# Patient Record
Sex: FEMALE | Race: WHITE | NOT HISPANIC OR LATINO | Employment: FULL TIME | URBAN - METROPOLITAN AREA
[De-identification: names, ages, dates, MRNs, and addresses within clinical notes are randomized per-mention and may not be internally consistent; named-entity substitution may affect disease eponyms.]

---

## 2023-04-13 ENCOUNTER — TELEPHONE (OUTPATIENT)
Dept: PRIMARY CARE | Facility: CLINIC | Age: 43
End: 2023-04-13
Payer: COMMERCIAL

## 2023-04-13 DIAGNOSIS — F41.9 ANXIETY: Primary | ICD-10-CM

## 2023-04-13 RX ORDER — VENLAFAXINE HYDROCHLORIDE 75 MG/1
1 CAPSULE, EXTENDED RELEASE ORAL DAILY
COMMUNITY
Start: 2022-10-25 | End: 2023-04-21 | Stop reason: SDUPTHER

## 2023-04-13 RX ORDER — VENLAFAXINE HYDROCHLORIDE 75 MG/1
75 CAPSULE, EXTENDED RELEASE ORAL DAILY
Qty: 90 CAPSULE | Refills: 0 | OUTPATIENT
Start: 2023-04-13

## 2023-04-13 RX ORDER — ESCITALOPRAM OXALATE 10 MG/1
1 TABLET ORAL DAILY
COMMUNITY
End: 2023-04-21 | Stop reason: ALTCHOICE

## 2023-04-14 NOTE — TELEPHONE ENCOUNTER
Pt usually sees Carar.  She has not been in; please schedule and see if Aleja will do a bridge.  THANKS

## 2023-04-21 RX ORDER — VENLAFAXINE HYDROCHLORIDE 75 MG/1
75 CAPSULE, EXTENDED RELEASE ORAL DAILY
Qty: 90 CAPSULE | Refills: 0 | Status: SHIPPED | OUTPATIENT
Start: 2023-04-21 | End: 2023-07-10

## 2023-04-21 NOTE — TELEPHONE ENCOUNTER
Called pt and left voicemail to set up appointment with Aleja. - She is to let us know if she needs a bridge for her meds up to the follow up appointment.

## 2023-04-21 NOTE — TELEPHONE ENCOUNTER
Pt calling back, she made an apt w/ you for 4/26/23 but does need a refill on her medication until then.

## 2023-04-26 ENCOUNTER — LAB (OUTPATIENT)
Dept: LAB | Facility: LAB | Age: 43
End: 2023-04-26
Payer: COMMERCIAL

## 2023-04-26 ENCOUNTER — OFFICE VISIT (OUTPATIENT)
Dept: PRIMARY CARE | Facility: CLINIC | Age: 43
End: 2023-04-26
Payer: COMMERCIAL

## 2023-04-26 VITALS
WEIGHT: 132 LBS | DIASTOLIC BLOOD PRESSURE: 75 MMHG | TEMPERATURE: 97.9 F | BODY MASS INDEX: 24.29 KG/M2 | SYSTOLIC BLOOD PRESSURE: 110 MMHG | HEIGHT: 62 IN | HEART RATE: 92 BPM

## 2023-04-26 DIAGNOSIS — F32.A DEPRESSION, UNSPECIFIED DEPRESSION TYPE: ICD-10-CM

## 2023-04-26 DIAGNOSIS — R61 NIGHT SWEAT: ICD-10-CM

## 2023-04-26 DIAGNOSIS — R61 NIGHT SWEATS: ICD-10-CM

## 2023-04-26 DIAGNOSIS — F41.9 ANXIETY: Primary | ICD-10-CM

## 2023-04-26 DIAGNOSIS — D12.6 ADENOMA OF COLON: ICD-10-CM

## 2023-04-26 PROBLEM — G25.81 RESTLESS LEG SYNDROME: Status: ACTIVE | Noted: 2023-04-26

## 2023-04-26 PROBLEM — F51.04 CHRONIC INSOMNIA: Status: ACTIVE | Noted: 2023-04-26

## 2023-04-26 PROBLEM — D69.6 THROMBOCYTOPENIA (CMS-HCC): Status: RESOLVED | Noted: 2023-04-26 | Resolved: 2023-04-26

## 2023-04-26 PROBLEM — K52.9 COLITIS: Status: RESOLVED | Noted: 2023-04-26 | Resolved: 2023-04-26

## 2023-04-26 PROBLEM — K52.832 LYMPHOCYTIC COLITIS: Status: RESOLVED | Noted: 2023-04-26 | Resolved: 2023-04-26

## 2023-04-26 PROBLEM — N20.0 CALCULUS OF KIDNEY: Status: RESOLVED | Noted: 2023-04-26 | Resolved: 2023-04-26

## 2023-04-26 PROBLEM — M76.60 ACHILLES TENDON PAIN: Status: ACTIVE | Noted: 2023-04-26

## 2023-04-26 PROBLEM — R91.1 PULMONARY NODULE: Status: RESOLVED | Noted: 2023-04-26 | Resolved: 2023-04-26

## 2023-04-26 PROBLEM — D64.9 ANEMIA: Status: RESOLVED | Noted: 2023-04-26 | Resolved: 2023-04-26

## 2023-04-26 LAB — THYROTROPIN (MIU/L) IN SER/PLAS BY DETECTION LIMIT <= 0.05 MIU/L: 1.99 MIU/L (ref 0.44–3.98)

## 2023-04-26 PROCEDURE — 83001 ASSAY OF GONADOTROPIN (FSH): CPT

## 2023-04-26 PROCEDURE — 1036F TOBACCO NON-USER: CPT | Performed by: NURSE PRACTITIONER

## 2023-04-26 PROCEDURE — 83002 ASSAY OF GONADOTROPIN (LH): CPT

## 2023-04-26 PROCEDURE — 36415 COLL VENOUS BLD VENIPUNCTURE: CPT

## 2023-04-26 PROCEDURE — 84443 ASSAY THYROID STIM HORMONE: CPT

## 2023-04-26 PROCEDURE — 99214 OFFICE O/P EST MOD 30 MIN: CPT | Performed by: NURSE PRACTITIONER

## 2023-04-26 RX ORDER — BUSPIRONE HYDROCHLORIDE 5 MG/1
5 TABLET ORAL 2 TIMES DAILY
Qty: 60 TABLET | Refills: 1 | Status: SHIPPED | OUTPATIENT
Start: 2023-04-26 | End: 2023-07-11

## 2023-04-26 RX ORDER — HYDROXYZINE HYDROCHLORIDE 25 MG/1
25 TABLET, FILM COATED ORAL 2 TIMES DAILY PRN
COMMUNITY
End: 2024-05-14 | Stop reason: ALTCHOICE

## 2023-04-26 RX ORDER — IBUPROFEN 200 MG
TABLET ORAL EVERY 6 HOURS PRN
COMMUNITY
End: 2023-04-26 | Stop reason: ALTCHOICE

## 2023-04-26 ASSESSMENT — ENCOUNTER SYMPTOMS
HEADACHES: 0
FATIGUE: 0
CHEST TIGHTNESS: 0
FEVER: 0
NUMBNESS: 0
PALPITATIONS: 0
WEAKNESS: 0
DIZZINESS: 0
SHORTNESS OF BREATH: 0
COUGH: 0
CHILLS: 0

## 2023-04-26 NOTE — PROGRESS NOTES
Subjective   Cele George is a 42 y.o. female who presents for Med Refill and Follow-up (Couple issues to discuss).    Med Refill  Associated symptoms include abdominal pain, arthralgias, nausea and vomiting. Pertinent negatives include no chest pain, chills, coughing, fatigue, fever, headaches, numbness or weakness.      She presents to the office today for medication refills and follow up.   Taking the medication as prescribed. Taking daily in the morning.  No side effects.  Feeling sad down, helpless and  hopeless since being more anxious lately.  A lot of self doubt-  feeling not good enough.  Motivation has been a struggle lately.  No SI or HI.  Excessive worry all the time.  Panic attacks: Has been having panic attacks a couple times a week.   Takes Hydroxyzine but makes her too tired.  Has been over thinking many things.  Counseling: No counseling  Sleep: Takes Hydroxyzine to help with sleep but then too groggy in the morning.  Caffeine use: cup of coffee in the morning  Alcohol use: (+)social alcohol  Drug use: No drug  use  Diet: improving/ does not like meat  Exercise: [employer] 3-4 days a week. Walking  In December she was having several days of abdominal pain,  nausea and vomiting- to the ER due to dehydration.   She has since been scoped. EGD was good.  Colonoscopy revealed 5 polyps- adenomas- repeat in 3 years.  Gi system still not back to normal- she thinks her anxiety may be contributing.      She also reports she has been having night sweats. This has been going on for a while. Not any worse or better with the initiation of Venlafaxine about a year ago.    She still has pain in her thumb joints (R>L).  This is better if she rests and wears a brace.   She is not interested in a referral to ortho hand today.    Review of Systems   Constitutional:  Negative for chills, fatigue and fever.   Eyes:  Negative for visual disturbance.   Respiratory:  Negative for cough, chest tightness and shortness  "of breath.    Cardiovascular:  Negative for chest pain, palpitations and leg swelling.   Gastrointestinal:  Positive for abdominal pain, diarrhea, nausea and vomiting.   Musculoskeletal:  Positive for arthralgias.   Neurological:  Negative for dizziness, weakness, numbness and headaches.   Psychiatric/Behavioral:  Positive for dysphoric mood and sleep disturbance. Negative for self-injury and suicidal ideas.        Objective   /75 (BP Location: Right arm, Patient Position: Sitting)   Pulse 92   Temp 36.6 °C (97.9 °F) (Temporal)   Ht 1.575 m (5' 2\")   Wt 59.9 kg (132 lb)   BMI 24.14 kg/m²     Physical Exam  Constitutional:       General: She is not in acute distress.     Appearance: Normal appearance. She is not toxic-appearing.   Eyes:      Extraocular Movements: Extraocular movements intact.      Conjunctiva/sclera: Conjunctivae normal.      Pupils: Pupils are equal, round, and reactive to light.   Cardiovascular:      Rate and Rhythm: Normal rate and regular rhythm.      Pulses: Normal pulses.      Heart sounds: Normal heart sounds, S1 normal and S2 normal. No murmur heard.  Pulmonary:      Effort: Pulmonary effort is normal. No respiratory distress.      Breath sounds: Normal breath sounds.   Abdominal:      General: Bowel sounds are normal.      Palpations: Abdomen is soft.      Tenderness: There is no abdominal tenderness.   Musculoskeletal:      Cervical back: Normal range of motion.      Right lower leg: No edema.      Left lower leg: No edema.   Lymphadenopathy:      Cervical: No cervical adenopathy.   Neurological:      Mental Status: She is alert and oriented to person, place, and time.   Psychiatric:         Attention and Perception: Attention normal.         Mood and Affect: Mood and affect normal.         Behavior: Behavior normal. Behavior is cooperative.         Thought Content: Thought content normal.         Cognition and Memory: Cognition normal.         Judgment: Judgment normal. "         Assessment/Plan   Problem List Items Addressed This Visit          Digestive    Adenoma of colon     Repeat colonoscopy in 1/2026            Other    Anxiety     Add Buspirone to help with anxiety. Will follow up virtually in 4-6 weeks.         Relevant Medications    busPIRone (Buspar) 5 mg tablet    Depression     Continue Venlafaxine at the current dose.         Night sweat     Check labs today.          Other Visit Diagnoses       Night sweats    -  Primary    Relevant Orders    TSH with reflex to Free T4 if abnormal (Completed)    FSH    Luteinizing hormone

## 2023-04-27 LAB
FOLLITROPIN (IU/L) IN SER/PLAS: 2.8 IU/L
LUTEINIZING HORMONE (IU/ML) IN SER/PLAS: 1.4 IU/L

## 2023-04-27 ASSESSMENT — ENCOUNTER SYMPTOMS
ARTHRALGIAS: 1
DYSPHORIC MOOD: 1
VOMITING: 1
NAUSEA: 1
ABDOMINAL PAIN: 1
SLEEP DISTURBANCE: 1
DIARRHEA: 1

## 2023-06-07 ENCOUNTER — TELEMEDICINE (OUTPATIENT)
Dept: PRIMARY CARE | Facility: CLINIC | Age: 43
End: 2023-06-07
Payer: COMMERCIAL

## 2023-06-07 DIAGNOSIS — F41.9 ANXIETY: Primary | ICD-10-CM

## 2023-06-07 DIAGNOSIS — F32.A DEPRESSION, UNSPECIFIED DEPRESSION TYPE: ICD-10-CM

## 2023-06-07 PROBLEM — G47.10 EXCESSIVE SLEEPINESS: Status: RESOLVED | Noted: 2023-06-07 | Resolved: 2023-06-07

## 2023-06-07 PROBLEM — R53.83 FATIGUE: Status: RESOLVED | Noted: 2023-06-07 | Resolved: 2023-06-07

## 2023-06-07 PROBLEM — N28.89 MEDULLARY CALCIFICATION OF KIDNEY: Status: RESOLVED | Noted: 2023-06-07 | Resolved: 2023-06-07

## 2023-06-07 PROBLEM — E88.09 PROTEINS SERUM PLASMA LOW: Status: RESOLVED | Noted: 2023-06-07 | Resolved: 2023-06-07

## 2023-06-07 PROBLEM — R17 ELEVATED BILIRUBIN: Status: RESOLVED | Noted: 2023-06-07 | Resolved: 2023-06-07

## 2023-06-07 PROCEDURE — 99213 OFFICE O/P EST LOW 20 MIN: CPT | Performed by: NURSE PRACTITIONER

## 2023-06-07 ASSESSMENT — ENCOUNTER SYMPTOMS
NERVOUS/ANXIOUS: 1
FEVER: 0
SLEEP DISTURBANCE: 0
FATIGUE: 0
CHILLS: 0
DYSPHORIC MOOD: 1

## 2023-06-08 NOTE — ASSESSMENT & PLAN NOTE
Continue the current dose of medications for now.   Plan to follow up in 3 months or sooner if needed.

## 2023-06-08 NOTE — PROGRESS NOTES
Subjective   Patient ID: Cele George is a 42 y.o. female who presents for Follow-up (anxiety).    An interactive audio and video telecommunication system which permits real time communications between the patient (at the originating site) and provider (at the distant site) was utilized to provide this telehealth service.    Verbal consent was requested and obtained from Cele George for a telehealth visit. All issues as below were discussed and addressed but no physical exam was performed. If it was felt that the patient should be evaluated in the clinic then they were directed there. The patient verbally consented to the visit.  She was located in the [facility] for the visit.       HPI  She presents today virtually to follow up on anxiety and depression. She reports she has been taking the Venlafaxine in the morning as well as the Buspirone. Is often only taking the Buspirone once a day-forgets the second dose.  No side effects.  Not feeling sad, down, helpless or hopeless as much.  Overall more mellow.  Less snappy and irritable.  No SI or HI.  Still with anxiety and worrying.  (+) panic attacks but less frequent.  Takes the Hydroxyzine occasionally to help with sleep.  She would like to continue on the current dose for now.    Review of Systems   Constitutional:  Negative for chills, fatigue and fever.   Psychiatric/Behavioral:  Positive for dysphoric mood. Negative for self-injury, sleep disturbance and suicidal ideas. The patient is nervous/anxious.        Objective   There were no vitals taken for this visit.    Physical Exam  Constitutional:       General: She is not in acute distress.     Appearance: Normal appearance. She is not toxic-appearing.   Pulmonary:      Effort: Pulmonary effort is normal. No respiratory distress.   Neurological:      Mental Status: She is alert and oriented to person, place, and time.   Psychiatric:         Mood and Affect: Mood normal.         Behavior: Behavior normal.          Thought Content: Thought content normal.         Judgment: Judgment normal.         Assessment/Plan   Problem List Items Addressed This Visit          Other    Anxiety - Primary    Depression     Continue the current dose of medications for now.   Plan to follow up in 3 months or sooner if needed.                 It has been a pleasure seeing you today!

## 2023-07-10 DIAGNOSIS — F41.9 ANXIETY: ICD-10-CM

## 2023-07-10 RX ORDER — VENLAFAXINE HYDROCHLORIDE 75 MG/1
75 CAPSULE, EXTENDED RELEASE ORAL DAILY
Qty: 90 CAPSULE | Refills: 0 | Status: SHIPPED | OUTPATIENT
Start: 2023-07-10 | End: 2023-12-06 | Stop reason: SDUPTHER

## 2023-07-11 DIAGNOSIS — F41.9 ANXIETY: ICD-10-CM

## 2023-07-11 RX ORDER — BUSPIRONE HYDROCHLORIDE 5 MG/1
5 TABLET ORAL 2 TIMES DAILY
Qty: 180 TABLET | Refills: 0 | Status: SHIPPED | OUTPATIENT
Start: 2023-07-11 | End: 2023-12-11 | Stop reason: SDUPTHER

## 2023-10-04 DIAGNOSIS — F41.9 ANXIETY: Primary | ICD-10-CM

## 2023-10-09 RX ORDER — VENLAFAXINE HYDROCHLORIDE 37.5 MG/1
37.5 CAPSULE, EXTENDED RELEASE ORAL DAILY
Qty: 90 CAPSULE | Refills: 0 | Status: SHIPPED | OUTPATIENT
Start: 2023-10-09 | End: 2023-12-06 | Stop reason: WASHOUT

## 2023-11-29 ENCOUNTER — APPOINTMENT (OUTPATIENT)
Dept: PRIMARY CARE | Facility: CLINIC | Age: 43
End: 2023-11-29
Payer: COMMERCIAL

## 2023-12-06 DIAGNOSIS — F41.9 ANXIETY: ICD-10-CM

## 2023-12-06 RX ORDER — VENLAFAXINE HYDROCHLORIDE 75 MG/1
75 CAPSULE, EXTENDED RELEASE ORAL DAILY
Qty: 90 CAPSULE | Refills: 0 | Status: SHIPPED | OUTPATIENT
Start: 2023-12-06 | End: 2024-02-29 | Stop reason: DRUGHIGH

## 2023-12-11 ENCOUNTER — TELEMEDICINE (OUTPATIENT)
Dept: PRIMARY CARE | Facility: CLINIC | Age: 43
End: 2023-12-11
Payer: COMMERCIAL

## 2023-12-11 DIAGNOSIS — F32.A DEPRESSION, UNSPECIFIED DEPRESSION TYPE: ICD-10-CM

## 2023-12-11 DIAGNOSIS — R11.0 NAUSEA: ICD-10-CM

## 2023-12-11 DIAGNOSIS — F41.9 ANXIETY: Primary | ICD-10-CM

## 2023-12-11 PROCEDURE — 99213 OFFICE O/P EST LOW 20 MIN: CPT | Performed by: NURSE PRACTITIONER

## 2023-12-11 RX ORDER — BUSPIRONE HYDROCHLORIDE 5 MG/1
5 TABLET ORAL 3 TIMES DAILY
Qty: 270 TABLET | Refills: 0 | Status: SHIPPED | OUTPATIENT
Start: 2023-12-11 | End: 2024-05-14 | Stop reason: SDUPTHER

## 2023-12-11 RX ORDER — ONDANSETRON 4 MG/1
4 TABLET, FILM COATED ORAL EVERY 12 HOURS PRN
Qty: 9 TABLET | Refills: 0 | Status: SHIPPED | OUTPATIENT
Start: 2023-12-11 | End: 2023-12-18

## 2023-12-11 ASSESSMENT — ENCOUNTER SYMPTOMS
CHILLS: 0
SLEEP DISTURBANCE: 1
ABDOMINAL PAIN: 0
NAUSEA: 1
VOMITING: 1
DYSPHORIC MOOD: 1
NERVOUS/ANXIOUS: 1
FEVER: 0
FATIGUE: 0

## 2023-12-11 ASSESSMENT — PATIENT HEALTH QUESTIONNAIRE - PHQ9
SUM OF ALL RESPONSES TO PHQ9 QUESTIONS 1 AND 2: 0
2. FEELING DOWN, DEPRESSED OR HOPELESS: NOT AT ALL
1. LITTLE INTEREST OR PLEASURE IN DOING THINGS: NOT AT ALL

## 2023-12-11 NOTE — PROGRESS NOTES
Subjective   Cele George is a 43 y.o. female who presents for Med Refill.    An interactive audio and video telecommunication system which permits real time communications between the patient (at the originating site) and provider (at the distant site) was utilized to provide this telehealth service.    Verbal consent was requested and obtained from Cele George       for a telehealth visit. All issues as below were discussed and addressed but no physical exam was performed. If it was felt that the patient should be evaluated in the clinic then they were directed there. The patient verbally consented to the visit. She was located in the [facility] for the visit.       HPI  She presents today virtually for a follow up and medications refills. She is taking the medication as prescribed.   No side effects.  (+ ) Feeling sad and down, helpless and hopeless at times  This is mainly situational.  Motivation is ok.  NO SI or HI.  (+) excessive worry  (+) worry about worst case scenarios  (+) panic attacks occurring about once a week.    Sleep has not been great- taking Zzzquil at night to help with sleep. The Hydroxyzine makes her too sleepy in the morning when she takes it.  Feels she will need to make a change with medications as she is having a hard time with anxiety.   Anxiety is so bad at times she is having nausea. (+) vomiting at times.   Requests a refill on the Zofran. Takes very rarely.     Review of Systems   Constitutional:  Negative for chills, fatigue and fever.   Gastrointestinal:  Positive for nausea and vomiting. Negative for abdominal pain.   Psychiatric/Behavioral:  Positive for dysphoric mood and sleep disturbance. Negative for self-injury and suicidal ideas. The patient is nervous/anxious.      Objective   There were no vitals taken for this visit.    Physical Exam  Constitutional:       General: She is not in acute distress.     Appearance: Normal appearance. She is not toxic-appearing.    Pulmonary:      Effort: Pulmonary effort is normal. No respiratory distress.   Neurological:      Mental Status: She is alert and oriented to person, place, and time.   Psychiatric:         Mood and Affect: Mood normal.         Behavior: Behavior normal.         Thought Content: Thought content normal.         Judgment: Judgment normal.       Assessment/Plan   Problem List Items Addressed This Visit       Anxiety - Primary     Worse lately- increase the Venlafaxine to 150 mg daily. Follow up virtually in one month.         Relevant Medications    busPIRone (Buspar) 5 mg tablet    Depression     Worse lately- increase the Venlafaxine to 150 mg daily.         Nausea     PRN Zofran ordered.          Relevant Medications    ondansetron (Zofran) 4 mg tablet       It has been a pleasure seeing you today!

## 2023-12-12 ENCOUNTER — TELEPHONE (OUTPATIENT)
Dept: PRIMARY CARE | Facility: CLINIC | Age: 43
End: 2023-12-12
Payer: COMMERCIAL

## 2023-12-12 DIAGNOSIS — Z12.31 ENCOUNTER FOR SCREENING MAMMOGRAM FOR BREAST CANCER: Primary | ICD-10-CM

## 2023-12-12 NOTE — TELEPHONE ENCOUNTER
----- Message from MARCUS Perez sent at 12/11/2023 10:48 PM EST -----  She needs a 4-6 week VV follow up anxiety . Also check to see when her last mammogram was.

## 2023-12-12 NOTE — TELEPHONE ENCOUNTER
LM on pt voicemail to call to schedule for virtual on anxiety. Aleja would like to know when are last mammogram was.

## 2023-12-26 ENCOUNTER — ANCILLARY PROCEDURE (OUTPATIENT)
Dept: RADIOLOGY | Facility: CLINIC | Age: 43
End: 2023-12-26
Payer: COMMERCIAL

## 2023-12-26 DIAGNOSIS — Z12.31 ENCOUNTER FOR SCREENING MAMMOGRAM FOR BREAST CANCER: ICD-10-CM

## 2023-12-26 PROCEDURE — 77063 BREAST TOMOSYNTHESIS BI: CPT

## 2023-12-26 PROCEDURE — 77063 BREAST TOMOSYNTHESIS BI: CPT | Performed by: RADIOLOGY

## 2023-12-26 PROCEDURE — 77067 SCR MAMMO BI INCL CAD: CPT | Performed by: RADIOLOGY

## 2023-12-26 PROCEDURE — 77067 SCR MAMMO BI INCL CAD: CPT

## 2023-12-27 ENCOUNTER — HOSPITAL ENCOUNTER (OUTPATIENT)
Dept: RADIOLOGY | Facility: EXTERNAL LOCATION | Age: 43
Discharge: HOME | End: 2023-12-27

## 2023-12-29 ENCOUNTER — ANCILLARY PROCEDURE (OUTPATIENT)
Dept: RADIOLOGY | Facility: CLINIC | Age: 43
End: 2023-12-29
Payer: COMMERCIAL

## 2023-12-29 ENCOUNTER — TELEPHONE (OUTPATIENT)
Dept: PRIMARY CARE | Facility: CLINIC | Age: 43
End: 2023-12-29
Payer: COMMERCIAL

## 2023-12-29 DIAGNOSIS — R92.8 ABNORMAL SCREENING MAMMOGRAM: ICD-10-CM

## 2023-12-29 DIAGNOSIS — R92.8 ABNORMAL SCREENING MAMMOGRAM: Primary | ICD-10-CM

## 2023-12-29 PROCEDURE — 77065 DX MAMMO INCL CAD UNI: CPT | Mod: RT

## 2023-12-29 PROCEDURE — 77061 BREAST TOMOSYNTHESIS UNI: CPT | Mod: RIGHT SIDE | Performed by: STUDENT IN AN ORGANIZED HEALTH CARE EDUCATION/TRAINING PROGRAM

## 2023-12-29 PROCEDURE — 76642 ULTRASOUND BREAST LIMITED: CPT | Mod: RIGHT SIDE | Performed by: STUDENT IN AN ORGANIZED HEALTH CARE EDUCATION/TRAINING PROGRAM

## 2023-12-29 PROCEDURE — 76982 USE 1ST TARGET LESION: CPT | Mod: RT

## 2023-12-29 PROCEDURE — 76642 ULTRASOUND BREAST LIMITED: CPT | Mod: RT

## 2023-12-29 PROCEDURE — 77065 DX MAMMO INCL CAD UNI: CPT | Mod: RIGHT SIDE | Performed by: STUDENT IN AN ORGANIZED HEALTH CARE EDUCATION/TRAINING PROGRAM

## 2024-01-04 DIAGNOSIS — N60.01 BREAST CYST, RIGHT: Primary | ICD-10-CM

## 2024-01-10 ENCOUNTER — TELEMEDICINE (OUTPATIENT)
Dept: PRIMARY CARE | Facility: CLINIC | Age: 44
End: 2024-01-10
Payer: COMMERCIAL

## 2024-01-10 DIAGNOSIS — R11.0 NAUSEA: ICD-10-CM

## 2024-01-10 DIAGNOSIS — F32.A DEPRESSION, UNSPECIFIED DEPRESSION TYPE: ICD-10-CM

## 2024-01-10 DIAGNOSIS — F41.9 ANXIETY: Primary | ICD-10-CM

## 2024-01-10 PROCEDURE — 99213 OFFICE O/P EST LOW 20 MIN: CPT | Performed by: NURSE PRACTITIONER

## 2024-01-10 ASSESSMENT — ENCOUNTER SYMPTOMS
FEVER: 0
FATIGUE: 0
NERVOUS/ANXIOUS: 1
NAUSEA: 1
SLEEP DISTURBANCE: 0
VOMITING: 0
DYSPHORIC MOOD: 1
PALPITATIONS: 1
CHILLS: 0

## 2024-01-10 NOTE — PROGRESS NOTES
Subjective   Cele George is a 43 y.o. female who presents for Anxiety.    An interactive audio and video telecommunication system which permits real time communications between the patient (at the originating site) and provider (at the distant site) was utilized to provide this telehealth service.    Verbal consent was requested and obtained from Cele George            for a telehealth visit. All issues as below were discussed and addressed but no physical exam was performed. If it was felt that the patient should be evaluated in the clinic then they were directed there. The patient verbally consented to the visit.  She was located in the [facility] for the visit.       HPI   She presents today by virtual visit for a follow-up on anxiety.  At her last visit on 12/11/2023 the venlafaxine dose was increased from 37.5mg to 75 mg daily.  She is taking in the a.m.  No significant side effects noted.  She reports she has noticed a small difference.  She still feels anxious and has mind racing.  At times  gets so nervous that she has to take Zofran due to nausea.  No feeling sad, down, helpless, hopeless.  No SI or HI.  About 10 days ago after going out to dinner she was sitting at a friend's house playing games and her alarm went off alerting her her heart rate was elevated.  It was 140.  She felt shaky and anxious at the time.  No associated chest pain, shortness of breath, dizziness.  This lasted about 3 hours.  Then she went home and fell asleep.  She felt fine the following day.    Review of Systems   Constitutional:  Negative for chills, fatigue and fever.   Cardiovascular:  Positive for palpitations.   Gastrointestinal:  Positive for nausea. Negative for vomiting.   Psychiatric/Behavioral:  Positive for dysphoric mood. Negative for self-injury, sleep disturbance and suicidal ideas. The patient is nervous/anxious.        Objective   LMP  (LMP Unknown)     Physical Exam  Constitutional:       General: She is  not in acute distress.     Appearance: Normal appearance. She is not toxic-appearing.   Pulmonary:      Effort: Pulmonary effort is normal. No respiratory distress.   Neurological:      Mental Status: She is alert and oriented to person, place, and time.   Psychiatric:         Mood and Affect: Mood normal.         Behavior: Behavior normal.         Thought Content: Thought content normal.         Judgment: Judgment normal.         Assessment/Plan   Problem List Items Addressed This Visit       Anxiety - Primary     Continue with the current dose of venlafaxine for now.  Plan to follow-up in 3 months or sooner if needed.         Depression     Continue with the current dose of venlafaxine for now.  Plan to follow-up in 3 months or sooner if needed.          Nausea     As needed Zofran ordered-advised to sparingly.            It has been a pleasure seeing you today!

## 2024-01-11 NOTE — ASSESSMENT & PLAN NOTE
Continue with the current dose of venlafaxine for now.  Plan to follow-up in 3 months or sooner if needed.

## 2024-02-05 ENCOUNTER — TELEMEDICINE (OUTPATIENT)
Dept: PRIMARY CARE | Facility: CLINIC | Age: 44
End: 2024-02-05
Payer: COMMERCIAL

## 2024-02-05 ENCOUNTER — APPOINTMENT (OUTPATIENT)
Dept: PRIMARY CARE | Facility: CLINIC | Age: 44
End: 2024-02-05
Payer: COMMERCIAL

## 2024-02-05 DIAGNOSIS — J01.10 ACUTE NON-RECURRENT FRONTAL SINUSITIS: Primary | ICD-10-CM

## 2024-02-05 PROCEDURE — 1036F TOBACCO NON-USER: CPT | Performed by: NURSE PRACTITIONER

## 2024-02-05 PROCEDURE — 99213 OFFICE O/P EST LOW 20 MIN: CPT | Performed by: NURSE PRACTITIONER

## 2024-02-05 RX ORDER — CEPHALEXIN 500 MG/1
500 CAPSULE ORAL 2 TIMES DAILY
Qty: 20 CAPSULE | Refills: 0 | Status: SHIPPED | OUTPATIENT
Start: 2024-02-05 | End: 2024-02-15

## 2024-02-05 NOTE — PATIENT INSTRUCTIONS
Stop the augmentin.   Take the cephalexin as directed. Please add Fluticasone 2 sprays each nostril daily.   Let us know in 4-5 days if no better

## 2024-02-05 NOTE — PROGRESS NOTES
Subjective   Patient ID: Cele George is a 43 y.o. female who presents for Follow-up (From urgent care).    [facility] today for virtual visit to follow up on sinus infection. [facility] given augmentin for a sinus infection Made her throw up, tried again yeateerday ate with it and made her nauseous.   Had a cold 2 weeks ago and in the last week she developed left sinus pressure and discolored discharge.   No fever or cough noted    Review of Systems   HENT:          As noted in HPI         Objective   LMP  (LMP Unknown)     Physical Exam  Constitutional:       Appearance: She is normal weight.   Pulmonary:      Effort: Pulmonary effort is normal.   Neurological:      Mental Status: She is alert.   Psychiatric:         Mood and Affect: Mood normal.         Behavior: Behavior normal.         Thought Content: Thought content normal.         Judgment: Judgment normal.         Assessment/Plan   Problem List Items Addressed This Visit    None  Visit Diagnoses         Codes    Acute non-recurrent frontal sinusitis    -  Primary J01.10    Relevant Medications    cephalexin (Keflex) 500 mg capsule

## 2024-02-29 DIAGNOSIS — F41.9 ANXIETY: Primary | ICD-10-CM

## 2024-02-29 RX ORDER — VENLAFAXINE HYDROCHLORIDE 150 MG/1
150 CAPSULE, EXTENDED RELEASE ORAL DAILY
Qty: 90 CAPSULE | Refills: 0 | Status: SHIPPED | OUTPATIENT
Start: 2024-02-29 | End: 2024-05-14 | Stop reason: SDUPTHER

## 2024-05-14 ENCOUNTER — OFFICE VISIT (OUTPATIENT)
Dept: PRIMARY CARE | Facility: CLINIC | Age: 44
End: 2024-05-14
Payer: COMMERCIAL

## 2024-05-14 VITALS
HEIGHT: 61 IN | SYSTOLIC BLOOD PRESSURE: 112 MMHG | TEMPERATURE: 97.8 F | DIASTOLIC BLOOD PRESSURE: 80 MMHG | WEIGHT: 135 LBS | HEART RATE: 97 BPM | OXYGEN SATURATION: 97 % | BODY MASS INDEX: 25.49 KG/M2

## 2024-05-14 DIAGNOSIS — F32.A DEPRESSION, UNSPECIFIED DEPRESSION TYPE: ICD-10-CM

## 2024-05-14 DIAGNOSIS — F41.9 ANXIETY: ICD-10-CM

## 2024-05-14 DIAGNOSIS — F51.04 CHRONIC INSOMNIA: Primary | ICD-10-CM

## 2024-05-14 PROCEDURE — 99214 OFFICE O/P EST MOD 30 MIN: CPT | Performed by: NURSE PRACTITIONER

## 2024-05-14 RX ORDER — TRAZODONE HYDROCHLORIDE 50 MG/1
50 TABLET ORAL NIGHTLY PRN
Qty: 45 TABLET | Refills: 0 | Status: SHIPPED | OUTPATIENT
Start: 2024-05-14

## 2024-05-14 RX ORDER — BUSPIRONE HYDROCHLORIDE 5 MG/1
5 TABLET ORAL 3 TIMES DAILY
Qty: 270 TABLET | Refills: 1 | Status: SHIPPED | OUTPATIENT
Start: 2024-05-14

## 2024-05-14 RX ORDER — VENLAFAXINE HYDROCHLORIDE 150 MG/1
150 CAPSULE, EXTENDED RELEASE ORAL DAILY
Qty: 90 CAPSULE | Refills: 1 | Status: SHIPPED | OUTPATIENT
Start: 2024-05-14

## 2024-05-14 ASSESSMENT — ENCOUNTER SYMPTOMS
FEVER: 0
FATIGUE: 0
INSOMNIA: 1
CHILLS: 0
SLEEP DISTURBANCE: 0
DYSPHORIC MOOD: 0

## 2024-05-14 NOTE — PROGRESS NOTES
"Subjective    Cele George is a 43 y.o. female who presents for Insomnia (Tried Unison, Zequil, and Hydroxyzine, nothing has helped. This has been going on for 2-3 weeks. She has tried this medications for the last 3 weeks, all of them (mixture of medications)).    Insomnia  Pertinent negatives include no chills, fatigue or fever.     She presents to the office today for follow up on anxiety and depression. Her biggest issue currently is insomnia.  She is taking the medication as prescribed.   No side effects.  No feeling sad, down, helpless or hopeless.  Motivation is good  No SI or HI.  (+) excessive worry.  (+) worry about worst case scenarios.  No panic attacks.  Was irritable at work until increasing the venlafaxine.  Feels anxiety and depression are in a good place on the current medications.    Sleep: sleeping from 10pm-1 AM.   Then up every 20 minutes tossing and turning.  No mind racing just unable to sleep.  Is having a hard time making it through the work day she is so tired.  Diet:  Overall pretty healthy  Exercise: No regular exercise.  Caffeine use: 2 cups of coffee in the morning.  Alcohol use: 2 drinks a week  Drug use: None    Review of Systems   Constitutional:  Negative for chills, fatigue and fever.   Psychiatric/Behavioral:  Negative for dysphoric mood, self-injury, sleep disturbance and suicidal ideas. The patient is nervous/anxious and has insomnia.      Objective   /80 (BP Location: Left arm, Patient Position: Sitting)   Pulse 97   Temp 36.6 °C (97.8 °F) (Temporal)   Ht 1.551 m (5' 1.06\")   Wt 61.2 kg (135 lb)   LMP  (LMP Unknown)   SpO2 97%   BMI 25.46 kg/m²     Physical Exam  Constitutional:       General: She is not in acute distress.     Appearance: Normal appearance. She is not toxic-appearing.   Eyes:      Extraocular Movements: Extraocular movements intact.      Conjunctiva/sclera: Conjunctivae normal.      Pupils: Pupils are equal, round, and reactive to light. "   Cardiovascular:      Rate and Rhythm: Normal rate and regular rhythm.      Pulses: Normal pulses.      Heart sounds: Normal heart sounds, S1 normal and S2 normal. No murmur heard.  Pulmonary:      Effort: Pulmonary effort is normal. No respiratory distress.      Breath sounds: Normal breath sounds.   Abdominal:      General: Bowel sounds are normal.      Palpations: Abdomen is soft.      Tenderness: There is no abdominal tenderness.   Musculoskeletal:      Right lower leg: No edema.      Left lower leg: No edema.   Lymphadenopathy:      Cervical: No cervical adenopathy.   Neurological:      Mental Status: She is alert and oriented to person, place, and time.   Psychiatric:         Attention and Perception: Attention normal.         Mood and Affect: Mood and affect normal.         Behavior: Behavior normal. Behavior is cooperative.         Thought Content: Thought content normal.         Cognition and Memory: Cognition normal.         Judgment: Judgment normal.         Assessment/Plan   Problem List Items Addressed This Visit       Anxiety     Well-controlled the current dose of venlafaxine.  Continue.         Relevant Medications    busPIRone (Buspar) 5 mg tablet    venlafaxine XR (Effexor-XR) 150 mg 24 hr capsule    Chronic insomnia - Primary     Hydroxyzine.  Start trazodone at bedtime.  She will keep me posted on how she is doing.         Relevant Medications    traZODone (Desyrel) 50 mg tablet    Depression     Well-controlled on the current dose of venlafaxine.  Continue.            It has been a pleasure seeing you today!

## 2024-05-14 NOTE — PATIENT INSTRUCTIONS
Continue to take current medications.  Stop Hydroxyzine.   You may take Trazodone at bedtime to help with sleep.

## 2024-05-16 ASSESSMENT — ENCOUNTER SYMPTOMS: NERVOUS/ANXIOUS: 1

## 2024-07-09 ENCOUNTER — HOSPITAL ENCOUNTER (OUTPATIENT)
Dept: RADIOLOGY | Facility: HOSPITAL | Age: 44
Discharge: HOME | End: 2024-07-09
Payer: COMMERCIAL

## 2024-07-09 ENCOUNTER — APPOINTMENT (OUTPATIENT)
Dept: RADIOLOGY | Facility: HOSPITAL | Age: 44
End: 2024-07-09
Payer: COMMERCIAL

## 2024-07-09 DIAGNOSIS — R92.1 CALCIFICATION OF RIGHT BREAST ON MAMMOGRAPHY: ICD-10-CM

## 2024-07-09 DIAGNOSIS — R92.8 ABNORMAL FINDINGS ON DIAGNOSTIC IMAGING OF BREAST: Primary | ICD-10-CM

## 2024-07-09 DIAGNOSIS — N60.01 BREAST CYST, RIGHT: ICD-10-CM

## 2024-07-09 PROCEDURE — 77065 DX MAMMO INCL CAD UNI: CPT | Mod: RIGHT SIDE | Performed by: RADIOLOGY

## 2024-07-09 PROCEDURE — 76642 ULTRASOUND BREAST LIMITED: CPT | Mod: RIGHT SIDE | Performed by: RADIOLOGY

## 2024-07-09 PROCEDURE — 76982 USE 1ST TARGET LESION: CPT | Mod: RT

## 2024-07-09 PROCEDURE — 76642 ULTRASOUND BREAST LIMITED: CPT | Mod: RT

## 2024-07-09 PROCEDURE — 77065 DX MAMMO INCL CAD UNI: CPT | Mod: RT

## 2024-07-09 NOTE — NURSING NOTE
After patient review of diagnostic results with Dr. Daily, support provided. Written literature regarding abnormal breast imaging and breast biopsy including what to expect before, during, and after the procedure reviewed with the patient. All questions answered. Patient selected Dr. Mojica for surgical consultation 7/11 8:30AM with biopsy to follow 7/12 8:00AM. Information provided and reviewed to include provider information and how to reach me directly with questions or concerns before concluding visit.

## 2024-07-09 NOTE — PROGRESS NOTES
Patient follow up scheduling:  right breast stereotactic biopsy per provider recommendation, 7/12 0800.

## 2024-07-09 NOTE — Clinical Note
Your patient Cele George seen for diagnostic breast imaging today has final results available for your review. I assisted with follow up scheduling and education review today. She will see Dr. Mojica for consultation 7/11, with biopsy to follow 7/12.

## 2024-07-10 ENCOUNTER — TELEPHONE (OUTPATIENT)
Dept: RADIOLOGY | Facility: HOSPITAL | Age: 44
End: 2024-07-10
Payer: COMMERCIAL

## 2024-07-10 NOTE — TELEPHONE ENCOUNTER
This RN Navigator responded with call back to patient v/m. Answered patient clarifying questions regarding surgical consult tomorrow with Dr. Mojica & what to expect. Therapeutic listening and support provided. Encouraged patient to call back as needed.

## 2024-07-11 ENCOUNTER — OFFICE VISIT (OUTPATIENT)
Dept: SURGERY | Facility: CLINIC | Age: 44
End: 2024-07-11
Payer: COMMERCIAL

## 2024-07-11 VITALS
BODY MASS INDEX: 26.23 KG/M2 | SYSTOLIC BLOOD PRESSURE: 118 MMHG | HEIGHT: 60 IN | OXYGEN SATURATION: 99 % | DIASTOLIC BLOOD PRESSURE: 80 MMHG | WEIGHT: 133.6 LBS | HEART RATE: 95 BPM

## 2024-07-11 DIAGNOSIS — R92.1 BREAST CALCIFICATIONS: Primary | ICD-10-CM

## 2024-07-11 PROCEDURE — 1036F TOBACCO NON-USER: CPT | Performed by: SURGERY

## 2024-07-11 PROCEDURE — 99204 OFFICE O/P NEW MOD 45 MIN: CPT | Performed by: SURGERY

## 2024-07-11 ASSESSMENT — ENCOUNTER SYMPTOMS
CHILLS: 0
FEVER: 0
DIARRHEA: 0
DIZZINESS: 0
COUGH: 0
NAUSEA: 1
PALPITATIONS: 0
CONSTIPATION: 0
HEADACHES: 0
BLOOD IN STOOL: 0
ABDOMINAL PAIN: 0
SHORTNESS OF BREATH: 0
VOMITING: 1

## 2024-07-11 NOTE — PROGRESS NOTES
GENERAL SURGERY CLINIC NOTE    Cele George   1980   [id number]     History Of Present Illness  Cele George is a 43 y.o. female who presents to the office for evaluation of right breast calcifications. She has not noticed any masses, nipple discharge or abnormalities. She has occasional right breast pain. She is scheduled for a biopsy tomorrow 7/12/24.    She underwent menarche at 10-11 and does not know if she has gone through menopause. She underwent hysterectomy at 30 and her ovaries are intact. She had 4 pregnancies, with her first at 17, and 2 children. She took OCPs prior to her hysterectomy.    Her  and sister in law were present for the appointment.     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    Surgical History  She has a past surgical history that includes Hysterectomy (01/13/2017); Kidney surgery (01/13/2017); Dilation and curettage of uterus (01/13/2017); and Appendectomy (03/20/2018).    Medications  Current Outpatient Medications on File Prior to Visit   Medication Sig Dispense Refill    busPIRone (Buspar) 5 mg tablet Take 1 tablet (5 mg) by mouth 3 times a day. 270 tablet 1    traZODone (Desyrel) 50 mg tablet Take 1 tablet (50 mg) by mouth as needed at bedtime for sleep. 45 tablet 0    venlafaxine XR (Effexor-XR) 150 mg 24 hr capsule Take 1 capsule (150 mg) by mouth once daily. Take with food. 90 capsule 1     No current facility-administered medications on file prior to visit.       Allergies  Codeine      Social History  She reports that she quit smoking about 3 years ago. Her smoking use included cigarettes. She has a 20 pack-year smoking history. She has been exposed to tobacco smoke. She has never used smokeless tobacco. She reports that she does not currently use alcohol. She reports that she does not use drugs.    Family History  Family History   Problem Relation Name Age of Onset    COPD Mother Mom     Skin cancer Mother Mom     Miscarriages / Stillbirths Mother Mom     Prostate cancer Father      Skin cancer Father      Muscular dystrophy Sister      Colon cancer Paternal Grandmother Grandma Gehri     Breast cancer Paternal Grandmother Grandma Gehri    Paternal grandmother had both breast and colon cancer at a later age (likely >60)  Father had prostate and skin cancer  Mother had skin cancer     Review of Systems   Constitutional:  Negative for chills and fever.   Respiratory:  Negative for cough and shortness of breath.    Cardiovascular:  Negative for chest pain and palpitations.   Gastrointestinal:  Positive for nausea and vomiting. Negative for abdominal pain, blood in stool, constipation and diarrhea.        Nausea and vomiting when stressed   Neurological:  Negative for dizziness and headaches.   All other systems reviewed and are negative.      Last Recorded Vitals  Blood pressure 118/80, pulse 95, height 1.524 m (5'), weight 60.6 kg (133 lb 9.6 oz), SpO2 99%.     Physical Exam  Constitutional:       General: She is not in acute distress.     Appearance: Normal appearance. She is not ill-appearing.   HENT:      Head: Normocephalic and atraumatic.   Cardiovascular:      Rate and Rhythm: Normal rate and regular rhythm.   Pulmonary:      Effort: Pulmonary effort is normal. No respiratory distress.      Breath sounds: Normal breath sounds.   Chest:   Breasts:     Right: Normal. No mass, nipple discharge, skin change or tenderness.      Left: Normal. No mass, nipple discharge, skin change or tenderness.    Abdominal:      General: There is no distension.      Palpations: Abdomen is soft.      Tenderness: There is no abdominal tenderness. There is no guarding.   Musculoskeletal:         General: No swelling.   Lymphadenopathy:      Upper Body:      Right upper body: No supraclavicular, axillary or pectoral adenopathy.      Left upper body: No supraclavicular, axillary or pectoral adenopathy.   Skin:     General: Skin is warm and dry.   Neurological:      Mental Status: She is alert and oriented to person, place, and time. Mental status is at baseline.   Psychiatric:         Mood and Affect: Mood normal.         Behavior: Behavior normal.          Relevant Results    BI mammo right diagnostic    Indeterminate calcifications right breast posterior depth.   Stable mammographic asymmetries reflect complex sonographic cysts. Continued surveillance is recommended over 2 year interval.     BI-RADS CATEGORY: BI-RADS Category:  4 Suspicious. Recommendation:  Surgical Consultation and Biopsy. Recommended Date:  Immediate. Laterality:  Right.   Stereotactic core biopsy right breast recommended. Surgical consultation with history and physical required prior to biopsy.   For any future breast imaging appointments, please call [phone] ([phone]).       MACRO: None   Signed by: Deion Daily 7/9/2024 9:06 AM Dictation workstation:   [id number]    Assessment and Plan  43 y.o. female with calcifications in her right breast as well as stable cysts. She is undergoing a stereotactic right breast biopsy tomorrow 7/12/24. I will call her with the initial results (5pm or later, after she is home from work) and asked her to follow up in the office in a week to discuss the results and next steps. She and her family expressed their understanding and all questions were answered.    Mary Mojica MD, FACS  General Surgery

## 2024-07-12 ENCOUNTER — HOSPITAL ENCOUNTER (OUTPATIENT)
Dept: RADIOLOGY | Facility: HOSPITAL | Age: 44
Discharge: HOME | End: 2024-07-12
Payer: COMMERCIAL

## 2024-07-12 VITALS
SYSTOLIC BLOOD PRESSURE: 121 MMHG | HEART RATE: 101 BPM | OXYGEN SATURATION: 100 % | DIASTOLIC BLOOD PRESSURE: 85 MMHG | RESPIRATION RATE: 18 BRPM

## 2024-07-12 DIAGNOSIS — R92.1 CALCIFICATION OF RIGHT BREAST ON MAMMOGRAPHY: ICD-10-CM

## 2024-07-12 DIAGNOSIS — R92.8 ABNORMAL FINDINGS ON DIAGNOSTIC IMAGING OF BREAST: ICD-10-CM

## 2024-07-12 PROCEDURE — 19081 BX BREAST 1ST LESION STRTCTC: CPT | Mod: RT

## 2024-07-12 PROCEDURE — 2720000007 HC OR 272 NO HCPCS

## 2024-07-12 PROCEDURE — 77065 DX MAMMO INCL CAD UNI: CPT | Mod: RT

## 2024-07-12 ASSESSMENT — PAIN SCALES - GENERAL: PAINLEVEL_OUTOF10: 0 - NO PAIN

## 2024-07-12 ASSESSMENT — PAIN - FUNCTIONAL ASSESSMENT: PAIN_FUNCTIONAL_ASSESSMENT: 0-10

## 2024-07-12 NOTE — DISCHARGE INSTRUCTIONS
AFTER THE TEST  A steri-strip and/or bandage will be placed over the incision. You may shower after 24 hours, however avoid swimming or soaking in tub for 3 days. Remove bandage after the shower. If present, leave the steri-strips in place to fall off on their own. If after 1 week the steri-strips are still on, you may remove them.      You may have mild discomfort at the test site. If needed, you may take Tylenol (Acetaminophen) for pain. Please avoid taking NSAIDs, Motrin, Advil, Aleve, or ibuprofen for 24 hours following the biopsy. After 24 hours you may resume NSAIDSs.      If you take aspirin, Plavix, Coumadin, Xarelto or Eliquis please tell us. If these medications were stopped by your provider, please ask them when to resume.      You may have some tenderness, bruising or slight bleeding at the site. Please apply ice packs to the site for 15 minutes on and 15 minutes off for a 2 hour minimum.      Most people can return to their usual routine after the procedure. Avoid Strenuous activity for 24 hours.      Sleep in a supportive bra the night after your biopsy. Continue to do so for comfort.      Call your provider if you have any of the following symptoms :  Fever  Increased pain  Increased bleeding  Redness  Increased swelling  Yellowish drainage    Your provider will get the biopsy results, usually within 5 - 7 days. Call your provider with any questions.

## 2024-07-18 LAB
LABORATORY COMMENT REPORT: NORMAL
PATH REPORT.FINAL DX SPEC: NORMAL
PATH REPORT.GROSS SPEC: NORMAL
PATH REPORT.RELEVANT HX SPEC: NORMAL
PATH REPORT.TOTAL CANCER: NORMAL

## 2024-07-19 ENCOUNTER — APPOINTMENT (OUTPATIENT)
Dept: SURGERY | Facility: CLINIC | Age: 44
End: 2024-07-19
Payer: COMMERCIAL

## 2024-07-19 VITALS
SYSTOLIC BLOOD PRESSURE: 106 MMHG | OXYGEN SATURATION: 98 % | BODY MASS INDEX: 26.46 KG/M2 | WEIGHT: 134.8 LBS | HEIGHT: 60 IN | HEART RATE: 88 BPM | DIASTOLIC BLOOD PRESSURE: 74 MMHG

## 2024-07-19 DIAGNOSIS — F51.04 CHRONIC INSOMNIA: ICD-10-CM

## 2024-07-19 DIAGNOSIS — R92.1 BREAST CALCIFICATIONS: Primary | ICD-10-CM

## 2024-07-19 PROCEDURE — 1036F TOBACCO NON-USER: CPT | Performed by: SURGERY

## 2024-07-19 PROCEDURE — 3008F BODY MASS INDEX DOCD: CPT | Performed by: SURGERY

## 2024-07-19 PROCEDURE — 99213 OFFICE O/P EST LOW 20 MIN: CPT | Performed by: SURGERY

## 2024-07-19 RX ORDER — TRAZODONE HYDROCHLORIDE 50 MG/1
50 TABLET ORAL NIGHTLY PRN
Qty: 90 TABLET | Refills: 1 | Status: SHIPPED | OUTPATIENT
Start: 2024-07-19

## 2024-07-19 ASSESSMENT — ENCOUNTER SYMPTOMS
HEADACHES: 0
CONSTIPATION: 0
DIARRHEA: 0
CHILLS: 0
SHORTNESS OF BREATH: 0
FEVER: 0
ABDOMINAL PAIN: 0
BLOOD IN STOOL: 0
VOMITING: 1
COUGH: 0
PALPITATIONS: 0
DIZZINESS: 0
NAUSEA: 1

## 2024-07-19 NOTE — PROGRESS NOTES
GENERAL SURGERY CLINIC NOTE    Cele George   1980   [id number]     History Of Present Illness  Cele George is a 43 y.o. female who presents to the office for follow up of right breast calcifications after undergoing biopsy. She had some discomfort and bruising that has improved. She has not noticed any masses, nipple discharge or abnormalities. She has occasional right breast pain.     She underwent menarche at 10-11 and does not know if she has gone through menopause. She underwent hysterectomy at 30 and her ovaries are intact. She had 4 pregnancies, with her first at 17, and 2 children. She took OCPs prior to her hysterectomy.    Her  was present for the appointment.     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    Surgical History  She has a past surgical history that includes Hysterectomy (01/13/2017); Kidney surgery (01/13/2017); Dilation and curettage of uterus (01/13/2017); Appendectomy (03/20/2018); and Breast biopsy (Right, 07/12/2024).    Medications  Current Outpatient Medications on File Prior to Visit   Medication Sig Dispense Refill    busPIRone (Buspar) 5 mg tablet Take 1 tablet (5 mg) by mouth 3 times a day. 270 tablet 1    traZODone (Desyrel) 50 mg tablet Take 1 tablet (50 mg) by mouth as needed at bedtime for sleep. 45 tablet 0    venlafaxine XR (Effexor-XR) 150 mg 24 hr capsule Take 1 capsule (150 mg) by mouth once daily. Take with food. 90 capsule 1     No current facility-administered medications on file  prior to visit.       Allergies  Codeine     Social History  She reports that she quit smoking about 3 years ago. Her smoking use included cigarettes. She has a 20 pack-year smoking history. She has been exposed to tobacco smoke. She has never used smokeless tobacco. She reports that she does not currently use alcohol. She reports that she does not use drugs.    Family History  Family History   Problem Relation Name Age of Onset    COPD Mother Mom     Skin cancer Mother Mom     Miscarriages / Stillbirths Mother Mom     Prostate cancer Father      Skin cancer Father      Muscular dystrophy Sister      Colon cancer Paternal Grandmother Grandma Gehri     Breast cancer Paternal Grandmother Grandma Gehri    Paternal grandmother had both breast and colon cancer at a later age (likely >60)  Father had prostate and skin cancer  Mother had skin cancer     Review of Systems   Constitutional:  Negative for chills and fever.   Respiratory:  Negative for cough and shortness of breath.    Cardiovascular:  Negative for chest pain and palpitations.   Gastrointestinal:  Positive for nausea and vomiting. Negative for abdominal pain, blood in stool, constipation and diarrhea.        Nausea and vomiting when stressed   Neurological:  Negative for dizziness and headaches.   All other systems reviewed and are negative.      Last Recorded Vitals  Blood pressure 106/74, pulse 88, height 1.524 m (5'), weight 61.1 kg (134 lb 12.8 oz), SpO2 98%.     Physical Exam  Constitutional:       General: She is not in acute distress.     Appearance: Normal appearance. She is not ill-appearing.   HENT:      Head: Normocephalic and atraumatic.   Cardiovascular:      Rate and Rhythm: Normal rate and regular rhythm.   Pulmonary:      Effort: Pulmonary effort is normal. No respiratory distress.      Breath sounds: Normal breath sounds.   Musculoskeletal:         General: No swelling.   Skin:     General: Skin is warm and dry.   Neurological:      Mental  Status: She is alert and oriented to person, place, and time. Mental status is at baseline.   Psychiatric:         Mood and Affect: Mood normal.         Behavior: Behavior normal.          Relevant Results  Pathology results reviewed    BI mammo right diagnostic    Indeterminate calcifications right breast posterior depth.   Stable mammographic asymmetries reflect complex sonographic cysts. Continued surveillance is recommended over 2 year interval.     BI-RADS CATEGORY: BI-RADS Category:  4 Suspicious. Recommendation:  Surgical Consultation and Biopsy. Recommended Date:  Immediate. Laterality:  Right.   Stereotactic core biopsy right breast recommended. Surgical consultation with history and physical required prior to biopsy.   For any future breast imaging appointments, please call [phone]).       MACRO: None   Signed by: Deion Daily 7/9/2024 9:06 AM Dictation workstation:   [id number]    Assessment and Plan  43 y.o. female with calcifications in her right breast that on biopsy showed focal PASH, UDH and additional benign findings. I recommend undergoing repeat right mammogram in 6 months. If that is stable, then she can resume yearly screening 6 months from then. She expressed concern having to possibly undergo additional biopsies - I discussed that if this area appeared more concerning, we can consider an excisional biopsy (lumpectomy) at that time rather than core biopsy to remove the entire area of concern. Follow up on an as needed basis. She and her family expressed their understanding and all questions were answered.    Mary Mojica MD, FACS  General Surgery

## 2024-08-16 ENCOUNTER — APPOINTMENT (OUTPATIENT)
Dept: PRIMARY CARE | Facility: CLINIC | Age: 44
End: 2024-08-16
Payer: COMMERCIAL

## 2024-08-16 DIAGNOSIS — F51.04 CHRONIC INSOMNIA: ICD-10-CM

## 2024-08-16 DIAGNOSIS — F41.9 ANXIETY: Primary | ICD-10-CM

## 2024-08-16 DIAGNOSIS — F32.A DEPRESSION, UNSPECIFIED DEPRESSION TYPE: ICD-10-CM

## 2024-08-16 PROCEDURE — 99213 OFFICE O/P EST LOW 20 MIN: CPT | Performed by: NURSE PRACTITIONER

## 2024-08-16 PROCEDURE — 1036F TOBACCO NON-USER: CPT | Performed by: NURSE PRACTITIONER

## 2024-08-16 ASSESSMENT — ENCOUNTER SYMPTOMS
CHILLS: 0
FATIGUE: 0
FEVER: 0
NERVOUS/ANXIOUS: 1
DYSPHORIC MOOD: 1
SLEEP DISTURBANCE: 0

## 2024-08-16 NOTE — ASSESSMENT & PLAN NOTE
Well-controlled on trazodone.  Continue trazodone 25 mg at bedtime.  Plan to follow-up in 3 months or sooner if needed.

## 2024-08-16 NOTE — PROGRESS NOTES
Subjective   Cele George is a 44 y.o. female who presents for 3 mon fu.    An interactive audio and video telecommunication system which permits real time communications between the patient (at the originating site) and provider (at the distant site) was utilized to provide this telehealth service.    Verbal consent was requested and obtained from Cele George for a telehealth visit. All issues as below were discussed and addressed but no physical exam was performed. If it was felt that the patient should be evaluated in the clinic then they were directed there. The patient verbally consented to the visit.  She was located in the [facility] for the visit.       HPI  She presents today via virtual visit for a follow-up on anxiety, depression, and insomnia.  She continues take the Venlafaxine in the a.m.  She takes the buspirone 5 mg  3 times a day.  She reports her anxiety and depression has not been great lately.  She has a lot of situational things going on recently.  Last month she had a breast biopsy.  This month she reports there is a lot going on at home.  She reports feeling sad & down.  Motivation has not been great.  No SI or HI.  She reports excessive worry every day.  She does worry about worst case scenarios.  About 2 times a week she feels like something is sitting on her chest due to feeling anxious and overwhelmed.  She has been taking the trazodone at bedtime for sleep.  Tried the 50 mg dose but it made her too groggy in the morning.  She feels the trazodone 25 mg at bedtime has been helpful with her sleep.  She would like to continue the current dose of medications for now.  She plans to follow-up with her therapist again.  Her last visit was 2 months ago.  Diet:  Overall pretty healthy/ not eating much due to decreased appetite from anxiety and stress.  Exercise: No regular exercise.  Caffeine use: 2 cups of coffee in the morning.  Alcohol use: social alcohol use.  Drug use: None    Review  of Systems   Constitutional:  Negative for chills, fatigue and fever.   Psychiatric/Behavioral:  Positive for dysphoric mood. Negative for self-injury, sleep disturbance and suicidal ideas. The patient is nervous/anxious.        Objective   LMP  (LMP Unknown)     Physical Exam  Constitutional:       General: She is not in acute distress.     Appearance: Normal appearance. She is not toxic-appearing.   Pulmonary:      Effort: Pulmonary effort is normal. No respiratory distress.   Neurological:      Mental Status: She is alert and oriented to person, place, and time.   Psychiatric:         Mood and Affect: Mood normal.         Behavior: Behavior normal.         Thought Content: Thought content normal.         Judgment: Judgment normal.         Assessment/Plan   Problem List Items Addressed This Visit       Anxiety - Primary     Mainly situational.  She plans to follow-up with therapy.  If no improvements, will refer her to psychiatry to look into medication adjustments.  Follow-up in 3 months or sooner if needed.         Chronic insomnia     Well-controlled on trazodone.  Continue trazodone 25 mg at bedtime.  Plan to follow-up in 3 months or sooner if needed.         Depression     Mainly situational.  She plans to follow-up with therapy.  If no improvements, will refer her to psychiatry to look into medication adjustments.  Follow-up in 3 months or sooner if needed.            It has been a pleasure seeing you today!

## 2024-08-16 NOTE — ASSESSMENT & PLAN NOTE
Mainly situational.  She plans to follow-up with therapy.  If no improvements, will refer her to psychiatry to look into medication adjustments.  Follow-up in 3 months or sooner if needed.

## 2024-11-20 ENCOUNTER — APPOINTMENT (OUTPATIENT)
Dept: PRIMARY CARE | Facility: CLINIC | Age: 44
End: 2024-11-20
Payer: COMMERCIAL

## 2024-11-20 VITALS
WEIGHT: 134.2 LBS | HEART RATE: 87 BPM | SYSTOLIC BLOOD PRESSURE: 110 MMHG | DIASTOLIC BLOOD PRESSURE: 70 MMHG | TEMPERATURE: 97.5 F | OXYGEN SATURATION: 97 % | BODY MASS INDEX: 26.21 KG/M2

## 2024-11-20 DIAGNOSIS — F51.04 CHRONIC INSOMNIA: ICD-10-CM

## 2024-11-20 DIAGNOSIS — Z00.00 HEALTHCARE MAINTENANCE: Primary | ICD-10-CM

## 2024-11-20 DIAGNOSIS — M79.645 BILATERAL THUMB PAIN: ICD-10-CM

## 2024-11-20 DIAGNOSIS — M79.644 BILATERAL THUMB PAIN: ICD-10-CM

## 2024-11-20 DIAGNOSIS — M25.511 ACUTE PAIN OF RIGHT SHOULDER: ICD-10-CM

## 2024-11-20 PROBLEM — M25.522 LEFT ELBOW PAIN: Status: ACTIVE | Noted: 2024-11-20

## 2024-11-20 PROCEDURE — 1036F TOBACCO NON-USER: CPT | Performed by: NURSE PRACTITIONER

## 2024-11-20 PROCEDURE — 99396 PREV VISIT EST AGE 40-64: CPT | Performed by: NURSE PRACTITIONER

## 2024-11-20 PROCEDURE — 99213 OFFICE O/P EST LOW 20 MIN: CPT | Performed by: NURSE PRACTITIONER

## 2024-11-20 RX ORDER — TRAZODONE HYDROCHLORIDE 50 MG/1
50 TABLET ORAL NIGHTLY PRN
Qty: 90 TABLET | Refills: 1 | Status: SHIPPED | OUTPATIENT
Start: 2024-11-20

## 2024-11-20 ASSESSMENT — ENCOUNTER SYMPTOMS
FREQUENCY: 0
PALPITATIONS: 0
SORE THROAT: 0
ABDOMINAL PAIN: 0
ARTHRALGIAS: 1
BRUISES/BLEEDS EASILY: 0
DYSPHORIC MOOD: 1
VOMITING: 0
WHEEZING: 0
CHEST TIGHTNESS: 0
SINUS PRESSURE: 0
BACK PAIN: 0
HEMATURIA: 0
SPEECH DIFFICULTY: 0
COUGH: 0
BLOOD IN STOOL: 0
NERVOUS/ANXIOUS: 1
FEVER: 0
DIFFICULTY URINATING: 0
UNEXPECTED WEIGHT CHANGE: 0
NUMBNESS: 0
MYALGIAS: 0
CONSTIPATION: 0
EYE PAIN: 0
EYE ITCHING: 0
PHOTOPHOBIA: 0
WEAKNESS: 0
DIZZINESS: 0
NAUSEA: 0
RHINORRHEA: 0
DIARRHEA: 0
EYE REDNESS: 0
POLYDIPSIA: 0
SLEEP DISTURBANCE: 0
POLYPHAGIA: 0
SHORTNESS OF BREATH: 0
FATIGUE: 0
ADENOPATHY: 0
EYE DISCHARGE: 0
CHILLS: 0
DYSURIA: 0
NECK PAIN: 0
HEADACHES: 0

## 2024-11-20 ASSESSMENT — PATIENT HEALTH QUESTIONNAIRE - PHQ9
2. FEELING DOWN, DEPRESSED OR HOPELESS: NOT AT ALL
SUM OF ALL RESPONSES TO PHQ9 QUESTIONS 1 AND 2: 0
1. LITTLE INTEREST OR PLEASURE IN DOING THINGS: NOT AT ALL

## 2024-11-20 NOTE — PATIENT INSTRUCTIONS
Focus on a low sodium/low fat diet (whole grains, fresh fruits and vegetables, lean meats). Avoid foods high in sugar.  Increase exercise as tolerated.  Continue medications at the current dose.  Follow up in 6 months (virtually) or sooner if needed.

## 2024-11-20 NOTE — PROGRESS NOTES
Subjective    Cele George is a 44 y.o. female who presents for Annual Exam (GYN: none LMP: Partial Hysterectomy. ), Joint Swelling (Both thumbs), Transfer Of Care (From Dr Guerra), Shoulder Pain (Right Shoulder. ), and Flu Vaccine (Pt denied).    Shoulder Pain   Pertinent negatives include no fever or numbness.     Last well exam: several years ago.  Health has been good in general.   There have been no changes in the patients PMH, PSH, FH or social history. Reviewed today.  Diet: room for improvement.  Exercise: No scheduled exercise.   Dental: No regular dental exams- last 3 years ago. Brushes  2 times a day. Flosses occasionally  Vision: Last eye exam: 1 year ago    Corrected with glasses and contacts  Tobacco Use: None  Alcohol Use: 6 drinks a month  Sexually active:  and sexually active.  PAP: s/p hysterectomy- still has ovaries  Immunizations: recommended Tdap  Colonoscopy: Due in 2/2026  Mammogram: scheduled 1/2024 (follow up diagnostic)  Last labs: ordered today     Thumbs are very painful and they feel like they are grinding.   It effects both sides.  There is never any swelling, redness or warmth. This has been an ongoing issue for several years but now is much more painful.   Her right shoulder has been bothering her for 2 months- cannot pick anything up. Hurts to lay on it. No known injury.  No neck pain.   No radiation of pain down the arm  No numbness/tingling/weakness.    Left elbow started about a month ago- worse with straightening. This is not present all the time but more with certain movements.    She feels she is doing well on her current medications. She is sleeping better on the Trazodone. No side effects noted.   Anxiety is still and issues but overall fairly stable.        Review of Systems   Constitutional:  Negative for chills, fatigue, fever and unexpected weight change.   HENT:  Negative for congestion, ear pain, hearing loss, nosebleeds, postnasal drip, rhinorrhea, sinus  pressure, sore throat and tinnitus.    Eyes:  Negative for photophobia, pain, discharge, redness, itching and visual disturbance.   Respiratory:  Negative for cough, chest tightness, shortness of breath and wheezing.    Cardiovascular:  Negative for chest pain, palpitations and leg swelling.   Gastrointestinal:  Negative for abdominal pain, blood in stool, constipation, diarrhea, nausea and vomiting.   Endocrine: Negative for cold intolerance, heat intolerance, polydipsia, polyphagia and polyuria.   Genitourinary:  Negative for difficulty urinating, dysuria, frequency, hematuria and urgency.   Musculoskeletal:  Positive for arthralgias. Negative for back pain, myalgias and neck pain.   Skin:  Negative for rash.   Neurological:  Negative for dizziness, syncope, speech difficulty, weakness, numbness and headaches.   Hematological:  Negative for adenopathy. Does not bruise/bleed easily.   Psychiatric/Behavioral:  Positive for dysphoric mood. Negative for sleep disturbance. The patient is nervous/anxious.      Objective   /70 (BP Location: Left arm, Patient Position: Sitting)   Pulse 87   Temp 36.4 °C (97.5 °F) (Temporal)   Wt 60.9 kg (134 lb 3.2 oz)   LMP  (LMP Unknown)   SpO2 97%   BMI 26.21 kg/m²     Physical Exam  Constitutional:       General: She is not in acute distress.     Appearance: Normal appearance. She is not toxic-appearing.   HENT:      Head: Normocephalic and atraumatic.      Right Ear: Tympanic membrane and ear canal normal.      Left Ear: Tympanic membrane and ear canal normal.      Nose: Nose normal.      Mouth/Throat:      Mouth: Mucous membranes are moist.      Pharynx: Oropharynx is clear.   Eyes:      Extraocular Movements: Extraocular movements intact.      Conjunctiva/sclera: Conjunctivae normal.      Pupils: Pupils are equal, round, and reactive to light.   Neck:      Thyroid: No thyroid mass or thyromegaly.   Cardiovascular:      Rate and Rhythm: Normal rate and regular rhythm.       Pulses: Normal pulses.      Heart sounds: Normal heart sounds, S1 normal and S2 normal. No murmur heard.  Pulmonary:      Effort: Pulmonary effort is normal. No respiratory distress.      Breath sounds: Normal breath sounds.   Chest:   Breasts:     Right: Tenderness present.      Left: Normal.      Comments: (+) slight tenderness noted to lateral right breast  (+) dense breast tissue bilaterally  Abdominal:      General: Abdomen is flat. Bowel sounds are normal.      Palpations: Abdomen is soft.      Tenderness: There is generalized abdominal tenderness. There is no guarding or rebound.   Musculoskeletal:         General: Normal range of motion.      Right shoulder: Tenderness present. No crepitus. Normal range of motion. Normal strength. Normal pulse.      Left shoulder: Normal. Normal pulse.      Right hand: Tenderness present. No swelling. Normal range of motion. Normal strength. Normal sensation. Normal pulse.      Left hand: Tenderness present. No swelling. Normal range of motion. Normal strength. Normal sensation. Normal pulse.      Cervical back: Normal range of motion and neck supple.      Right lower leg: No edema.      Left lower leg: No edema.      Comments: (+) TTP base of both thumbs   Lymphadenopathy:      Cervical: No cervical adenopathy.      Upper Body:      Right upper body: No axillary adenopathy.      Left upper body: No axillary adenopathy.   Skin:     General: Skin is warm and dry.   Neurological:      Mental Status: She is alert and oriented to person, place, and time.      Cranial Nerves: No cranial nerve deficit.      Sensory: No sensory deficit.      Motor: No weakness.      Coordination: Coordination normal.      Gait: Gait normal.      Deep Tendon Reflexes: Reflexes are normal and symmetric. Reflexes normal.   Psychiatric:         Attention and Perception: Attention normal.         Mood and Affect: Mood and affect normal.         Speech: Speech normal.         Behavior: Behavior normal.          Thought Content: Thought content normal.         Judgment: Judgment normal.         Assessment/Plan   Problem List Items Addressed This Visit       Chronic insomnia     Stable on the current dose of Trazodone. Continue.         Relevant Medications    traZODone (Desyrel) 50 mg tablet    Bilateral thumb pain     Given persistent and chronic, refer to orthopedic hand.         Relevant Orders    Referral to Orthopaedic Surgery    Acute pain of right shoulder    Relevant Orders    Referral to Physical Therapy    Healthcare maintenance - Primary     UTD on age appropriate screenings.         Relevant Orders    Comprehensive Metabolic Panel    CBC and Auto Differential    Lipid Panel       It has been a pleasure seeing you today!

## 2024-11-25 ENCOUNTER — LAB (OUTPATIENT)
Dept: LAB | Facility: LAB | Age: 44
End: 2024-11-25
Payer: COMMERCIAL

## 2024-11-25 DIAGNOSIS — Z00.00 HEALTHCARE MAINTENANCE: ICD-10-CM

## 2024-11-25 DIAGNOSIS — D75.89 MACROCYTOSIS: ICD-10-CM

## 2024-11-25 DIAGNOSIS — E53.8 VITAMIN B12 DEFICIENCY: ICD-10-CM

## 2024-11-25 LAB
ALBUMIN SERPL BCP-MCNC: 4.1 G/DL (ref 3.4–5)
ALP SERPL-CCNC: 57 U/L (ref 33–110)
ALT SERPL W P-5'-P-CCNC: 9 U/L (ref 7–45)
ANION GAP SERPL CALC-SCNC: 13 MMOL/L (ref 10–20)
AST SERPL W P-5'-P-CCNC: 10 U/L (ref 9–39)
BASOPHILS # BLD AUTO: 0.03 X10*3/UL (ref 0–0.1)
BASOPHILS NFR BLD AUTO: 0.6 %
BILIRUB SERPL-MCNC: 1 MG/DL (ref 0–1.2)
BUN SERPL-MCNC: 12 MG/DL (ref 6–23)
CALCIUM SERPL-MCNC: 8.8 MG/DL (ref 8.6–10.3)
CHLORIDE SERPL-SCNC: 102 MMOL/L (ref 98–107)
CHOLEST SERPL-MCNC: 194 MG/DL (ref 0–199)
CHOLESTEROL/HDL RATIO: 3.1
CO2 SERPL-SCNC: 27 MMOL/L (ref 21–32)
CREAT SERPL-MCNC: 0.76 MG/DL (ref 0.5–1.05)
EGFRCR SERPLBLD CKD-EPI 2021: >90 ML/MIN/1.73M*2
EOSINOPHIL # BLD AUTO: 0.15 X10*3/UL (ref 0–0.7)
EOSINOPHIL NFR BLD AUTO: 2.9 %
ERYTHROCYTE [DISTWIDTH] IN BLOOD BY AUTOMATED COUNT: 12.2 % (ref 11.5–14.5)
GLUCOSE SERPL-MCNC: 90 MG/DL (ref 74–99)
HCT VFR BLD AUTO: 43.5 % (ref 36–46)
HDLC SERPL-MCNC: 63.4 MG/DL
HGB BLD-MCNC: 14.6 G/DL (ref 12–16)
IMM GRANULOCYTES # BLD AUTO: 0.01 X10*3/UL (ref 0–0.7)
IMM GRANULOCYTES NFR BLD AUTO: 0.2 % (ref 0–0.9)
LDLC SERPL CALC-MCNC: 107 MG/DL
LYMPHOCYTES # BLD AUTO: 2.1 X10*3/UL (ref 1.2–4.8)
LYMPHOCYTES NFR BLD AUTO: 40.1 %
MCH RBC QN AUTO: 33.6 PG (ref 26–34)
MCHC RBC AUTO-ENTMCNC: 33.6 G/DL (ref 32–36)
MCV RBC AUTO: 100 FL (ref 80–100)
MONOCYTES # BLD AUTO: 0.5 X10*3/UL (ref 0.1–1)
MONOCYTES NFR BLD AUTO: 9.5 %
NEUTROPHILS # BLD AUTO: 2.45 X10*3/UL (ref 1.2–7.7)
NEUTROPHILS NFR BLD AUTO: 46.7 %
NON HDL CHOLESTEROL: 131 MG/DL (ref 0–149)
NRBC BLD-RTO: 0 /100 WBCS (ref 0–0)
PLATELET # BLD AUTO: 224 X10*3/UL (ref 150–450)
POTASSIUM SERPL-SCNC: 4.2 MMOL/L (ref 3.5–5.3)
PROT SERPL-MCNC: 6.5 G/DL (ref 6.4–8.2)
RBC # BLD AUTO: 4.34 X10*6/UL (ref 4–5.2)
SODIUM SERPL-SCNC: 138 MMOL/L (ref 136–145)
TRIGL SERPL-MCNC: 119 MG/DL (ref 0–149)
VLDL: 24 MG/DL (ref 0–40)
WBC # BLD AUTO: 5.2 X10*3/UL (ref 4.4–11.3)

## 2024-11-25 PROCEDURE — 82607 VITAMIN B-12: CPT

## 2024-11-25 PROCEDURE — 80053 COMPREHEN METABOLIC PANEL: CPT

## 2024-11-25 PROCEDURE — 80061 LIPID PANEL: CPT

## 2024-11-25 PROCEDURE — 82746 ASSAY OF FOLIC ACID SERUM: CPT

## 2024-11-25 PROCEDURE — 36415 COLL VENOUS BLD VENIPUNCTURE: CPT

## 2024-11-25 PROCEDURE — 85025 COMPLETE CBC W/AUTO DIFF WBC: CPT

## 2024-11-25 PROCEDURE — 84443 ASSAY THYROID STIM HORMONE: CPT

## 2024-11-26 DIAGNOSIS — Z00.00 HEALTHCARE MAINTENANCE: ICD-10-CM

## 2024-11-26 DIAGNOSIS — E53.8 VITAMIN B12 DEFICIENCY: ICD-10-CM

## 2024-11-26 DIAGNOSIS — D75.89 MACROCYTOSIS: Primary | ICD-10-CM

## 2024-11-26 LAB
TSH SERPL-ACNC: 2.57 MIU/L (ref 0.44–3.98)
VIT B12 SERPL-MCNC: 168 PG/ML (ref 211–911)

## 2024-11-27 LAB — FOLATE SERPL-MCNC: 6 NG/ML

## 2024-12-03 ENCOUNTER — HOSPITAL ENCOUNTER (OUTPATIENT)
Dept: RADIOLOGY | Facility: CLINIC | Age: 44
Discharge: HOME | End: 2024-12-03
Payer: COMMERCIAL

## 2024-12-03 ENCOUNTER — APPOINTMENT (OUTPATIENT)
Dept: ORTHOPEDIC SURGERY | Facility: CLINIC | Age: 44
End: 2024-12-03
Payer: COMMERCIAL

## 2024-12-03 ENCOUNTER — HOSPITAL ENCOUNTER (OUTPATIENT)
Dept: RADIOLOGY | Facility: EXTERNAL LOCATION | Age: 44
Discharge: HOME | End: 2024-12-03

## 2024-12-03 VITALS — BODY MASS INDEX: 26.36 KG/M2 | HEIGHT: 60 IN | WEIGHT: 134.26 LBS

## 2024-12-03 DIAGNOSIS — M79.641 BILATERAL HAND PAIN: ICD-10-CM

## 2024-12-03 DIAGNOSIS — M79.642 BILATERAL HAND PAIN: ICD-10-CM

## 2024-12-03 DIAGNOSIS — M79.645 BILATERAL THUMB PAIN: ICD-10-CM

## 2024-12-03 DIAGNOSIS — G56.03 BILATERAL CARPAL TUNNEL SYNDROME: ICD-10-CM

## 2024-12-03 DIAGNOSIS — M18.0 OSTEOARTHRITIS OF CARPOMETACARPAL (CMC) JOINT OF BOTH THUMBS: Primary | ICD-10-CM

## 2024-12-03 DIAGNOSIS — M79.644 BILATERAL THUMB PAIN: ICD-10-CM

## 2024-12-03 PROCEDURE — 99204 OFFICE O/P NEW MOD 45 MIN: CPT | Performed by: EMERGENCY MEDICINE

## 2024-12-03 PROCEDURE — 1036F TOBACCO NON-USER: CPT | Performed by: EMERGENCY MEDICINE

## 2024-12-03 PROCEDURE — 20604 DRAIN/INJ JOINT/BURSA W/US: CPT | Performed by: EMERGENCY MEDICINE

## 2024-12-03 PROCEDURE — 73130 X-RAY EXAM OF HAND: CPT | Mod: BILATERAL PROCEDURE | Performed by: RADIOLOGY

## 2024-12-03 PROCEDURE — 73130 X-RAY EXAM OF HAND: CPT | Mod: 50

## 2024-12-03 PROCEDURE — 3008F BODY MASS INDEX DOCD: CPT | Performed by: EMERGENCY MEDICINE

## 2024-12-03 RX ORDER — LIDOCAINE HYDROCHLORIDE 10 MG/ML
0.5 INJECTION, SOLUTION INFILTRATION; PERINEURAL
Status: COMPLETED | OUTPATIENT
Start: 2024-12-03 | End: 2024-12-03

## 2024-12-03 RX ORDER — TRIAMCINOLONE ACETONIDE 40 MG/ML
40 INJECTION, SUSPENSION INTRA-ARTICULAR; INTRAMUSCULAR
Status: COMPLETED | OUTPATIENT
Start: 2024-12-03 | End: 2024-12-03

## 2024-12-03 ASSESSMENT — PAIN - FUNCTIONAL ASSESSMENT: PAIN_FUNCTIONAL_ASSESSMENT: 0-10

## 2024-12-03 ASSESSMENT — PAIN SCALES - GENERAL: PAINLEVEL_OUTOF10: 8

## 2024-12-03 NOTE — PROGRESS NOTES
Subjective    Patient ID: Cele George is a 44 y.o. female.    Chief Complaint: Pain of the Left Hand (B/L base of thumb pain for the past 2 years, progressively worse for the past 6 months. No Hx of Sx, trauma, or injections to the area. Denies numbness/tingling. Denies associated neck pain. No diabetes. XR done today. Dual Action Advil and Tylenol PRN, Voltaren, thumb braces with no help/) and Pain of the Right Hand     Last Surgery: No surgery found  Last Surgery Date: No surgery found    Patient is a very pleasant 44-year-old female who works on her computer for most of the day coming in with 1-1/2 years of worsening bilateral hand versus wrist pain mostly located at her first CMC joints.  She denies any trauma.  No infectious symptoms.  For the past 6 months her pain has been getting almost unbearable.  She is trying to avoid surgery if at all possible.  She denies any history of hand or wrist surgery.  She has tried Advil, Tylenol, Voltaren, and thumb braces with little to no relief.  She is interested in potentially doing injections here today.        Objective   Right Hand Exam     Tenderness   The patient is experiencing tenderness in the dorsal area.    Range of Motion   The patient has normal right wrist ROM.     Muscle Strength   The patient has normal right wrist strength.  Wrist extension: 5/5   Wrist flexion: 5/5     Tests   Phalen’s Sign: negative  Tinel's sign (median nerve): negative  Finkelstein's test: negative    Other   Sensation: normal      Left Hand Exam     Tenderness   The patient is experiencing tenderness in the dorsal area.     Range of Motion   The patient has normal left wrist ROM.    Muscle Strength   The patient has normal left wrist strength.  Wrist extension: 5/5   Wrist flexion: 5/5     Tests   Phalen’s Sign: negative  Tinel's sign (median nerve): negative  Finkelstein's test: negative    Other   Sensation: normal    Comments:  Bilateral fine finger movements are intact.   Sensation is intact to light touch in the median ulnar and radial nerve distributions.  She does have tenderness at the bilateral first CMC joints.  Positive grind testing at the first CMC joints.  Her symptoms seem to be worse with provocative testing on the right than the left.            Image Results:  Point of Care Ultrasound  These images are not reportable by radiology and will not be interpreted   by  Radiologists.    Bilateral hand x-rays were reviewed and interpreted by me on 12/3/2024 and demonstrated degenerative changes at the first CMC joints without any evidence of acute injury or fracture.    Patient ID: Cele George is a 44 y.o. female.    Hand / UE Inj/Asp: bilateral thumb CMC for osteoarthritis on 12/3/2024 3:36 PM  Indications: pain  Details: 25 G needle, ultrasound-guided dorsal approach  Medications (Right): 40 mg triamcinolone acetonide 40 mg/mL; 0.5 mL lidocaine 10 mg/mL (1 %)  Medications (Left): 40 mg triamcinolone acetonide 40 mg/mL; 0.5 mL lidocaine 10 mg/mL (1 %)  Outcome: tolerated well, no immediate complications  Procedure, treatment alternatives, risks and benefits explained, specific risks discussed. Consent was given by the patient. Immediately prior to procedure a time out was called to verify the correct patient, procedure, equipment, support staff and site/side marked as required. Patient was prepped and draped in the usual sterile fashion.           Assessment/Plan   Encounter Diagnoses:  Osteoarthritis of carpometacarpal (CMC) joint of both thumbs    Bilateral hand pain    Bilateral thumb pain    Bilateral carpal tunnel syndrome    Orders Placed This Encounter    Hand / UE Inj/Asp    XR hand 3+ views bilateral    Point of Care Ultrasound     No follow-ups on file.    We discussed her treatment options and eventually decided to perform bilateral first CMC joint cortisone injections under ultrasound guidance.  She is going to continue her Comfort Cool braces and  prescription dose Voltaren gel topically 3 times a day.  She tolerated the procedures okay but did get a little bit lightheaded and the injections were relatively painful.  Activity modifications were reviewed and she is going to follow-up with me in about a month to see how she is responding.  If she is still having symptoms or if the injections wear off soon we will likely send her to Dr. Klein for surgical consultation.    Referred by Aleja Zepeda.     ** Please excuse any errors in grammar or translation related to this dictation. Voice recognition software was utilized to prepare this document. **       Valente Wisdom MD  [facility]

## 2024-12-31 ENCOUNTER — APPOINTMENT (OUTPATIENT)
Dept: ORTHOPEDIC SURGERY | Facility: CLINIC | Age: 44
End: 2024-12-31
Payer: COMMERCIAL

## 2024-12-31 VITALS — BODY MASS INDEX: 26.31 KG/M2 | HEIGHT: 60 IN | WEIGHT: 134 LBS

## 2024-12-31 DIAGNOSIS — M18.0 OSTEOARTHRITIS OF CARPOMETACARPAL (CMC) JOINT OF BOTH THUMBS: Primary | ICD-10-CM

## 2024-12-31 PROCEDURE — 99213 OFFICE O/P EST LOW 20 MIN: CPT | Performed by: EMERGENCY MEDICINE

## 2024-12-31 PROCEDURE — 3008F BODY MASS INDEX DOCD: CPT | Performed by: EMERGENCY MEDICINE

## 2024-12-31 ASSESSMENT — PAIN - FUNCTIONAL ASSESSMENT: PAIN_FUNCTIONAL_ASSESSMENT: NO/DENIES PAIN

## 2024-12-31 NOTE — PROGRESS NOTES
Subjective    Patient ID: Cele George is a 44 y.o. female.    Chief Complaint: Pain of the Left Wrist and Pain of the Right Wrist (Bilateral CMC injection 12/3/24 Some relief. Injections lasted about 3wks. Within last week having more pain, difficulties opening jars, button jeans, usage of hands, more strenuous.  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  She denies any trauma.  No infectious symptoms.  For the past 6 months her pain has been getting almost unbearable.  She is trying to avoid surgery if at all possible.  She denies any history of hand or wrist surgery.  She has tried Advil, Tylenol, Voltaren, and thumb braces with little to no relief.  She is interested in potentially doing injections here today. We decided to perform bilateral first CMC joint cortisone injections under ultrasound guidance.  She is going to continue her Comfort Cool braces and prescription dose Voltaren gel topically 3 times a day.  She tolerated the procedures okay but did get a little bit lightheaded and the injections were relatively painful.  Activity modifications were reviewed and she is going to follow-up with me in about a month to see how she is responding.  If she is still having symptoms or if the injections wear off soon we will likely send her to Dr. Klein for surgical consultation.    Update on 12/31/2024.  Ms. George is coming back in for a follow-up visit regarding her bilateral wrist pain at both first CMC joints.  We did bilateral ultrasound-guided cortisone injections at those joints on 12/3/2024 and she states that she has had a decent amount of relief but that she still has some persistent pain and definitely does not want to repeat those injections.  She states that if  she has multiple buttons or if she is on her phone for a prolonged amount of time her pain gets worse.  She is interested in surgery.        Objective   Right Hand Exam     Tenderness   The patient is experiencing tenderness in the dorsal area.    Range of Motion   The patient has normal right wrist ROM.     Muscle Strength   The patient has normal right wrist strength.  Wrist extension: 5/5   Wrist flexion: 5/5     Tests   Phalen’s Sign: negative  Tinel's sign (median nerve): negative  Finkelstein's test: negative    Other   Sensation: normal      Left Hand Exam     Tenderness   The patient is experiencing tenderness in the dorsal area.     Range of Motion   The patient has normal left wrist ROM.    Muscle Strength   The patient has normal left wrist strength.  Wrist extension: 5/5   Wrist flexion: 5/5     Tests   Phalen’s Sign: negative  Tinel's sign (median nerve): negative  Finkelstein's test: negative    Other   Sensation: normal    Comments:  Bilateral fine finger movements are intact.  Sensation is intact to light touch in the median ulnar and radial nerve distributions.  She does have tenderness at the bilateral first CMC joints.  Positive grind testing at the first CMC joints.    Symptoms have slightly improved but are mostly unchanged            Image Results:  No new imaging     Patient ID: Cele George is a 44 y.o. female.    Procedures    Assessment/Plan   Encounter Diagnoses:  Osteoarthritis of carpometacarpal (CMC) joint of both thumbs    No orders of the defined types were placed in this encounter.    No follow-ups on file.    We discussed her treatment options and eventually decided to refer her to see Dr. Klein for a surgical consultation since she has not really responded fully to the injections and does not want to repeat these injections.  She can follow-up with me as needed moving forward.    ** Please excuse any errors in grammar or translation related to this dictation. Voice recognition  software was utilized to prepare this document. **       Valente Wisdom MD  [facility]

## 2025-01-12 ENCOUNTER — OFFICE VISIT (OUTPATIENT)
Dept: URGENT CARE | Age: 45
End: 2025-01-12
Payer: COMMERCIAL

## 2025-01-12 VITALS
OXYGEN SATURATION: 98 % | RESPIRATION RATE: 15 BRPM | DIASTOLIC BLOOD PRESSURE: 69 MMHG | SYSTOLIC BLOOD PRESSURE: 99 MMHG | TEMPERATURE: 98.2 F | HEART RATE: 90 BPM

## 2025-01-12 DIAGNOSIS — J01.10 ACUTE NON-RECURRENT FRONTAL SINUSITIS: Primary | ICD-10-CM

## 2025-01-12 PROCEDURE — 1036F TOBACCO NON-USER: CPT | Performed by: NURSE PRACTITIONER

## 2025-01-12 PROCEDURE — 99204 OFFICE O/P NEW MOD 45 MIN: CPT | Performed by: NURSE PRACTITIONER

## 2025-01-12 RX ORDER — DOXYCYCLINE 100 MG/1
100 CAPSULE ORAL 2 TIMES DAILY
Qty: 20 CAPSULE | Refills: 0 | Status: SHIPPED | OUTPATIENT
Start: 2025-01-12 | End: 2025-01-22

## 2025-01-12 NOTE — PROGRESS NOTES
SUBJECTIVE:   Cele George is a 44 y.o. female who complains of congestion, nasal blockage, post nasal drip, dry cough, and headache for 15 days. She denies a history of shortness of breath, weakness, and wheezing and denies a history of asthma. Patient denies smoke cigarettes.  Was treated for sinus infection for 5 days with amoxicillin, not improving.     OBJECTIVE:  General: Vitals noted, no distress, afebrile. Normal phonation, no stridor, no trismus  ENT: TMs bulging with cloudy effusion bilaterally, EACs unremarkable. Mastoids tender. Posterior oropharynx-mucous drainage, Uvula is in the midline and non-edematous. No Jeff's angina.  Neck: Supple. No meningismus through full range of motion. No lymphadenopathy.   Cardiac: Regular rate and rhythm, no murmur.  Lungs: Good aeration throughout. No adventitious breath sounds.   Abdomen: Soft, nontender, nonsurgical throughout. Normoactive bowel sounds.   Extremities: No peripheral edema  Skin: No rash  Neuro: No focal neurologic deficits.     ASSESSMENT:   sinusitis    PLAN:  History and examination consistent with acute uncomplicated sinusitis. No  indication for labs or imaging at this time. No evidence of sepsis, strep pharyngitis, or  pneumonia. Counseled patient/family on antibiotic treatment and supportive measures at  home. Patient advised to return to clinic or present to ED if symptoms change or worsen.  Otherwise follow-up with PCP.   Symptomatic therapy suggested: push fluids, rest, use vaporizer or mist prn, and return office visit prn if symptoms persist or worsen.

## 2025-01-21 ENCOUNTER — HOSPITAL ENCOUNTER (OUTPATIENT)
Dept: RADIOLOGY | Facility: HOSPITAL | Age: 45
Discharge: HOME | End: 2025-01-21
Payer: COMMERCIAL

## 2025-01-21 VITALS — WEIGHT: 134 LBS | HEIGHT: 60 IN | BODY MASS INDEX: 26.31 KG/M2

## 2025-01-21 DIAGNOSIS — R92.1 BREAST CALCIFICATIONS: ICD-10-CM

## 2025-01-21 PROCEDURE — 77066 DX MAMMO INCL CAD BI: CPT | Performed by: RADIOLOGY

## 2025-01-21 PROCEDURE — 77062 BREAST TOMOSYNTHESIS BI: CPT

## 2025-01-21 PROCEDURE — 77062 BREAST TOMOSYNTHESIS BI: CPT | Performed by: RADIOLOGY

## 2025-01-22 DIAGNOSIS — R92.1 BREAST CALCIFICATIONS: Primary | ICD-10-CM

## 2025-01-23 ENCOUNTER — TELEPHONE (OUTPATIENT)
Facility: CLINIC | Age: 45
End: 2025-01-23
Payer: COMMERCIAL

## 2025-01-23 NOTE — TELEPHONE ENCOUNTER
----- Message from Mary Mojica sent at 1/23/2025  9:56 AM EST -----  We can talk about the options if she'd like to. She can be set up for a 15 min telephone appointment whenever.  ----- Message -----  From: Amanda Marks CMA  Sent: 1/23/2025   9:51 AM EST  To: Mary Mojica MD    Reported to patient and she is concerned to the fact that more calcification have shown up.  She would like to know if that is something to be alarmed about.  She states that is contraindicating from the previous exam.  She was told last time if anything showed up then there maybe more testing.  ----- Message -----  From: Mary Mojica MD  Sent: 1/23/2025   9:17 AM EST  To: Amanda Marks CMA    No appointment, unless she needs another biopsy.  ----- Message -----  From: Amanda Marks CMA  Sent: 1/23/2025   9:09 AM EST  To: Mary Mojica MD    Does she need a 6 month appointment with you as well or will we just call her with the results?  ----- Message -----  From: Mary Mojica MD  Sent: 1/22/2025   3:48 PM EST  To: Amanda Marks CMA    Please let the patient know the results from her recent mammogram. The findings are likely benign but a 6 month interval is recommended. I ordered bilateral mammograms for 6 months from now. Thanks

## 2025-01-24 ENCOUNTER — TELEMEDICINE (OUTPATIENT)
Dept: SURGERY | Facility: CLINIC | Age: 45
End: 2025-01-24
Payer: COMMERCIAL

## 2025-01-24 DIAGNOSIS — R92.1 BREAST CALCIFICATIONS: Primary | ICD-10-CM

## 2025-01-24 ASSESSMENT — ENCOUNTER SYMPTOMS
SHORTNESS OF BREATH: 0
CHILLS: 0
PALPITATIONS: 0
HEADACHES: 0
COUGH: 0
FEVER: 0

## 2025-01-24 NOTE — PROGRESS NOTES
GENERAL SURGERY CLINIC NOTE    Cele George   1980   [id number]     History Of Present Illness  Cele George is a 44 y.o. female who presents to the office for telephone follow up after a recent mammogram. She underwent biopsy of right breast calcifications in 2024 and healed well.     She underwent menarche at 10-11 and does not know if she has gone through menopause. She underwent hysterectomy at 30 and her ovaries are intact. She had 4 pregnancies, with her first at 17, and 2 children. She took OCPs prior to her hysterectomy.     Past Medical History  She has a past medical history of Anemia (2023), Anxiety disorder, unspecified (10/25/2022), Calculus of kidney (2023), Colon polyp (), Elevated bilirubin (2023), Excessive sleepiness (2023), Fatigue (2023), Fibrocystic breast (727977), Lymphocytic colitis (2023), Medullary calcification of kidney (2023), Personal history of other specified conditions (2020), Proteins serum plasma low (2023), Pulmonary nodule (2023), Thrombocytopenia (CMS-HCC) (2023), and Usual hyperplasia of lactiferous duct.    She has no past medical history of Atypical ductal hyperplasia, breast.    Surgical History  She has a past surgical history that includes Hysterectomy (2017); Kidney surgery (2017); Dilation and curettage of uterus (2017); Appendectomy (2018); and Breast biopsy (Right, 2024).    Medications  Current Outpatient Medications on File Prior to Visit   Medication Sig Dispense Refill    busPIRone (Buspar) 5 mg tablet Take 1 tablet (5 mg) by mouth 3 times a day. 270 tablet 1    traZODone (Desyrel) 50 mg tablet Take 1 tablet (50 mg) by mouth as needed at bedtime for sleep. 90 tablet 1    venlafaxine XR (Effexor-XR) 150 mg 24 hr capsule Take 1 capsule (150 mg) by mouth once daily. With a meal 90 capsule 1    [] doxycycline (Vibramycin) 100 mg capsule  Take 1 capsule (100 mg) by mouth 2 times a day for 10 days. Take with at least 8 ounces (large glass) of water, do not lie down for 30 minutes after 20 capsule 0     No current facility-administered medications on file prior to visit.       Allergies  Augmentin [amoxicillin-pot clavulanate] and Codeine     Social History  She reports that she quit smoking about 4 years ago. Her smoking use included cigarettes. She has a 20 pack-year smoking history. She has been exposed to tobacco smoke. She has never used smokeless tobacco. She reports that she does not currently use alcohol. She reports that she does not use drugs.    Family History  Family History   Problem Relation Name Age of Onset    COPD Mother Mom     Skin cancer Mother Mom     Miscarriages / Stillbirths Mother Mom     Clotting disorder Mother Mom     Prostate cancer Father Robin Rushing     Skin cancer Father Robin Gehri     Cancer Father Robin Rusihng     Muscular dystrophy Sister      Colon cancer Paternal Grandmother Grandma Gehri     Breast cancer Paternal Grandmother Grandma Gehri     Cancer Paternal Grandmother Grandma Gehri    Paternal grandmother had both breast and colon cancer at a later age (likely >60)  Father had prostate and skin cancer  Mother had skin cancer     Review of Systems   Constitutional:  Negative for chills and fever.   Respiratory:  Negative for cough and shortness of breath.    Cardiovascular:  Negative for chest pain and palpitations.   Neurological:  Negative for headaches.   All other systems reviewed and are negative.      Last Recorded Vitals  There were no vitals taken for this visit.     Physical Exam  Neurological:      Mental Status: She is alert and oriented to person, place, and time. Mental status is at baseline.   Psychiatric:         Mood and Affect: Mood normal.         Behavior: Behavior normal.         Thought Content: Thought content normal.         Judgment: Judgment normal.          Relevant Results    Bilateral  diagnostic mammogram 1/21/25  IMPRESSION:  Probable benign bilateral calcifications.      BI-RADS CATEGORY:  BI-RADS Category:  3 Probably Benign.  Recommendation:  Short-term Interval Follow-up Imaging.  Recommended Date:  6 Months.  Laterality:  Bilateral.    R breast biopsy 7/12/24  FINAL DIAGNOSIS   A. BREAST CALCIFICATIONS, RIGHT, CORE BIOPSY:   -- Usual ductal hyperplasia, columnar cell change and sclerosing adenosis with microcalcifications.  -- Dilatation of mammary glands.  -- Apocrine metaplasia and apocrine cyst.  -- Focal pseudoangiomatous stromal hyperplasia.       BI mammo right diagnostic  Indeterminate calcifications right breast posterior depth.   Stable mammographic asymmetries reflect complex sonographic cysts. Continued surveillance is recommended over 2 year interval.     BI-RADS CATEGORY: BI-RADS Category:  4 Suspicious. Recommendation:  Surgical Consultation and Biopsy. Recommended Date:  Immediate. Laterality:  Right.   Stereotactic core biopsy right breast recommended. Surgical consultation with history and physical required prior to biopsy.   For any future breast imaging appointments, please call [phone]).       MACRO: None   Signed by: Deion Daily 7/9/2024 9:06 AM Dictation workstation:   [id number]    Assessment and Plan  44 y.o. female with calcifications in her right breast status post biopsy July 2024. Since then, follow up imaging showed bilateral breast calcifications. She was concerned about the additional calcifications. I discussed she did have extremely dense breast tissue and it is reasonable to undergo breast MRI to determine if these are more or less concerning. If the MRI and mammogram images are similar, we should be able to follow up with mammograms. In the longer term, persistent calcifications that require frequent imaging or biopsies can be considered for excision. I will also reach out to one of the radiologists to review the recent imaging. Follow up  with me after the MRI for a telephone appointment. She expressed her understanding and all questions were answered.     Mary Mojica MD, [facility]  General Surgery

## 2025-01-29 ENCOUNTER — TELEPHONE (OUTPATIENT)
Dept: SURGERY | Facility: CLINIC | Age: 45
End: 2025-01-29
Payer: COMMERCIAL

## 2025-01-29 DIAGNOSIS — R92.1 BREAST CALCIFICATIONS: Primary | ICD-10-CM

## 2025-01-29 NOTE — TELEPHONE ENCOUNTER
----- Message from Mary Mojica sent at 1/29/2025  1:17 PM EST -----  Regarding: RE: Peer to Peer  The case was closed and denied, but when I called, it supposedly can be reopened and reevaluated. I did the peer to peer and they approved a screening breast MRI. The auth # is [id number], approved for 1/24/25-2/22/25.  ----- Message -----  From: Amanda Marks CMA  Sent: 1/28/2025  10:37 AM EST  To: Mary Mojica MD  Subject: FW: Peer to Peer                                 [facility] doesn't do scheduled times they say to just call when you are ready.  ----- Message -----  From: Mary Mojica MD  Sent: 1/28/2025   9:29 AM EST  To: Amanda Marks CMA  Subject: RE: Peer to Peer                                 Tomorrow 12pm  ----- Message -----  From: Amanda Marks CMA  Sent: 1/28/2025   9:26 AM EST  To: Mary Mojica MD  Subject: FW: Peer to Peer                                 When would you like me to schedule this for you?  ----- Message -----  From: Rosas Anthony MA  Sent: 1/28/2025   9:16 AM EST  To: Mary Mojica MD; Amanda Marks CMA  Subject: Peer to Peer                                     Britt with  Prior Authorization called to let us know that this patient needs a prior authorization for her Bilateral Breast MRI, scheduled for 2/7/25. Peer to Peer number to call within 3 days is [phone] with the case number is: [id number]. Let me know when this is completed so I can let Britt know. Thank you!

## 2025-02-07 ENCOUNTER — HOSPITAL ENCOUNTER (OUTPATIENT)
Dept: RADIOLOGY | Facility: HOSPITAL | Age: 45
Discharge: HOME | End: 2025-02-07
Payer: COMMERCIAL

## 2025-02-07 DIAGNOSIS — R92.1 BREAST CALCIFICATIONS: ICD-10-CM

## 2025-02-07 PROCEDURE — A9575 INJ GADOTERATE MEGLUMI 0.1ML: HCPCS | Performed by: SURGERY

## 2025-02-07 PROCEDURE — 2550000001 HC RX 255 CONTRASTS: Performed by: SURGERY

## 2025-02-07 PROCEDURE — 77049 MRI BREAST C-+ W/CAD BI: CPT

## 2025-02-07 RX ORDER — GADOTERATE MEGLUMINE 376.9 MG/ML
0.2 INJECTION INTRAVENOUS
Status: COMPLETED | OUTPATIENT
Start: 2025-02-07 | End: 2025-02-07

## 2025-02-07 RX ADMIN — GADOTERATE MEGLUMINE 12 ML: 376.9 INJECTION INTRAVENOUS at 07:25

## 2025-02-14 ENCOUNTER — APPOINTMENT (OUTPATIENT)
Dept: SURGERY | Facility: CLINIC | Age: 45
End: 2025-02-14
Payer: COMMERCIAL

## 2025-02-14 DIAGNOSIS — R92.1 BREAST CALCIFICATIONS: Primary | ICD-10-CM

## 2025-02-14 ASSESSMENT — ENCOUNTER SYMPTOMS
FEVER: 0
COUGH: 0
PALPITATIONS: 0
CHILLS: 0
HEADACHES: 0
SHORTNESS OF BREATH: 0

## 2025-02-14 NOTE — PROGRESS NOTES
GENERAL SURGERY CLINIC NOTE    Cele George   1980   [id number]     History Of Present Illness  Cele George is a 44 y.o. female who presents for telephone follow up after undergoing breast MRI.     She underwent menarche at 10-11 and does not know if she has gone through menopause. She underwent hysterectomy at 30 and her ovaries are intact. She had 4 pregnancies, with her first at 17, and 2 children. She took OCPs prior to her hysterectomy.     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    She has no past medical history of Atypical ductal hyperplasia, breast.    Surgical History  She has a past surgical history that includes Hysterectomy (01/13/2017); Kidney surgery (01/13/2017); Dilation and curettage of uterus (01/13/2017); Appendectomy (03/20/2018); and Breast biopsy (Right, 07/12/2024).    Medications  Current Outpatient Medications on File Prior to Visit   Medication Sig Dispense Refill    busPIRone (Buspar) 5 mg tablet Take 1 tablet (5 mg) by mouth 3 times a day. 270 tablet 1    traZODone (Desyrel) 50 mg tablet Take 1 tablet (50 mg) by mouth as needed at bedtime for sleep. 90 tablet 1    venlafaxine XR (Effexor-XR) 150 mg 24 hr capsule Take 1 capsule (150 mg) by mouth once daily. With a meal 90 capsule 1     No current facility-administered medications on file prior to visit.       Allergies  Augmentin [amoxicillin-pot clavulanate] and Codeine      Social History  She reports that she quit smoking about 4 years ago. Her smoking use included cigarettes. She has a 20 pack-year smoking history. She has been exposed to tobacco smoke. She has never used smokeless tobacco. She reports that she does not currently use alcohol. She reports that she does not use drugs.    Family History  Family History   Problem Relation Name Age of Onset    COPD Mother Mom     Skin cancer Mother Mom     Miscarriages / Stillbirths Mother Mom     Clotting disorder Mother Mom     Prostate cancer Father Robin Rushing     Skin cancer Father Robin Rushing     Cancer Father Robin Rushing     Muscular dystrophy Sister      Colon cancer Paternal Grandmother Grandma Gehri     Breast cancer Paternal Grandmother Grandma Gehri     Cancer Paternal Grandmother Grandma Gehri    Paternal grandmother had both breast and colon cancer at a later age (likely >60)  Father had prostate and skin cancer  Mother had skin cancer     Review of Systems   Constitutional:  Negative for chills and fever.   Respiratory:  Negative for cough and shortness of breath.    Cardiovascular:  Negative for chest pain and palpitations.   Neurological:  Negative for headaches.   All other systems reviewed and are negative.      Last Recorded Vitals  There were no vitals taken for this visit.     Physical Exam  Neurological:      Mental Status: She is alert and oriented to person, place, and time. Mental status is at baseline.   Psychiatric:         Mood and Affect: Mood normal.         Behavior: Behavior normal.         Thought Content: Thought content normal.         Judgment: Judgment normal.          Relevant Results    MR breast bilateral w contrast full protocol  No MRI evidence of malignancy in either breast.  The recommendation for a six-month follow-up of bilateral probably benign breast calcifications given on exam dated 01/21/2025 stands. Recommend that this follow-up is performed at a dedicated breast center.   BI-RADS  CATEGORY: BI-RADS Category:  2 Benign. Recommendation:  Annual Screening. Recommended Date:  1 Year. Laterality:  Bilateral.   For any future breast imaging appointments, please call [phone] ([phone]).   I personally reviewed the images/study and I agree with the breast imaging fellow, Allen Sanchez D.O., findings as stated. This study was interpreted at [place], [place].   MACRO: None   Signed by: Love Theodore 2/7/2025 12:21 PM Dictation workstation:   [id number]    Bilateral diagnostic mammogram 1/21/25  IMPRESSION:  Probable benign bilateral calcifications.      BI-RADS CATEGORY:  BI-RADS Category:  3 Probably Benign.  Recommendation:  Short-term Interval Follow-up Imaging.  Recommended Date:  6 Months.  Laterality:  Bilateral.    R breast biopsy 7/12/24  FINAL DIAGNOSIS   A. BREAST CALCIFICATIONS, RIGHT, CORE BIOPSY:   -- Usual ductal hyperplasia, columnar cell change and sclerosing adenosis with microcalcifications.  -- Dilatation of mammary glands.  -- Apocrine metaplasia and apocrine cyst.  -- Focal pseudoangiomatous stromal hyperplasia.       BI mammo right diagnostic  Indeterminate calcifications right breast posterior depth.   Stable mammographic asymmetries reflect complex sonographic cysts. Continued surveillance is recommended over 2 year interval.     BI-RADS CATEGORY: BI-RADS Category:  4 Suspicious. Recommendation:  Surgical Consultation and Biopsy. Recommended Date:  Immediate. Laterality:  Right.   Stereotactic core biopsy right breast recommended. Surgical consultation with history and physical required prior to biopsy.   For any future breast imaging appointments, please call [phone] ([phone]).       MACRO: None   Signed by: Deion Daily 7/9/2024 9:06 AM Dictation workstation:   [id number]    Assessment and Plan  44 y.o. female with calcifications in her right breast status post biopsy July 2024. Since then, follow up imaging showed  bilateral breast calcifications. She was concerned about the additional calcifications. She underwent breast MRI that showed no new or significant concerns. She can undergo bilateral diagnostic mammograms in 6 months as scheduled (July). Follow up with me on an as needed basis. She expressed her understanding and all questions were answered.     Mary Mojica MD, Merged with [facility]  General Surgery

## 2025-02-26 ENCOUNTER — APPOINTMENT (OUTPATIENT)
Dept: ORTHOPEDIC SURGERY | Facility: CLINIC | Age: 45
End: 2025-02-26
Payer: COMMERCIAL

## 2025-02-26 ENCOUNTER — PREP FOR PROCEDURE (OUTPATIENT)
Dept: ORTHOPEDIC SURGERY | Facility: CLINIC | Age: 45
End: 2025-02-26

## 2025-02-26 VITALS — HEIGHT: 60 IN | BODY MASS INDEX: 25.52 KG/M2 | WEIGHT: 130 LBS

## 2025-02-26 DIAGNOSIS — M18.0 OSTEOARTHRITIS OF CARPOMETACARPAL (CMC) JOINT OF BOTH THUMBS: Primary | ICD-10-CM

## 2025-02-26 PROCEDURE — 1036F TOBACCO NON-USER: CPT | Performed by: ORTHOPAEDIC SURGERY

## 2025-02-26 PROCEDURE — 3008F BODY MASS INDEX DOCD: CPT | Performed by: ORTHOPAEDIC SURGERY

## 2025-02-26 PROCEDURE — 99204 OFFICE O/P NEW MOD 45 MIN: CPT | Performed by: ORTHOPAEDIC SURGERY

## 2025-02-26 ASSESSMENT — ENCOUNTER SYMPTOMS
OCCASIONAL FEELINGS OF UNSTEADINESS: 0
DEPRESSION: 0
LOSS OF SENSATION IN FEET: 0

## 2025-02-26 ASSESSMENT — PAIN - FUNCTIONAL ASSESSMENT: PAIN_FUNCTIONAL_ASSESSMENT: NO/DENIES PAIN

## 2025-02-26 NOTE — PROGRESS NOTES
Cele George is a new patient coming in with bilateral CMC DJD. She has seen Dr. Wisdom and received B/L thumb CMC CSI 12/3/2024 which provided good improvement for 3 weeks. No Hx of trauma or Sx to the area. denies numbness/tingling.  XR done 12/3/2024. Advil/Tylenol dual action PRN.Wearing braces PRN. Would like to discuss Sx.  Referred by: Valente Wisdom MD

## 2025-02-26 NOTE — H&P (VIEW-ONLY)
44 y.o. female presents today for evaluation of bilateral base of thumb pain, right worse than left. The patient reports pain for years, getting worse recently.  Pain is controlled. Patient reports no numbness and tingling.  Reports no previous surgeries or trauma to the area.  Reports pain worse with use, better at rest.   Pain dull ache, sharp at times. Splint has been worn as directed.   Has tried multiple splints and continues to have pain along with anti-inflammatories.  Had an injection by Dr. Wisdom about 2 months ago that helped for about a month or so and then symptoms returned.  She would like surgery.    Review of Systems    Constitutional: see HPI, no fever, no chills, not feeling tired, no significant weight gain or weight loss.   Eyes: No vision changes  ENT: no nosebleeds.   Cardiovascular: no chest pain.   Respiratory: no shortness of breath and no cough.   Gastrointestinal: no abdominal pain, no nausea, no vomiting and no diarrhea.   Musculoskeletal: per HPI  Neurological: no headache, no gait disturbances  Psychiatric: no depression and no sleep disturbances.   Endocrine: no muscle weakness and no muscle cramps.   Hematologic/Lymphatic: no swollen glands and no tendency for easy bruising or excessive swelling.     Patient's past medical history, past surgical history, allergies, and medications have been reviewed unless otherwise noted in the chart.     CMC Arthritis Exam  Inspection:  no evidence of infection, no edema, no erythema, no ecchymosis, Palpation:  compartments are soft, pain with palpation over the Thumb CMC joint, Range of Motion:  full wrist and finger range of motion, Stability:  no wrist or finger instability detected, Strength:  5/5  and pinch strength, Skin:  intact, Vascular:  capillary refill <2 seconds distally, Sensation:  intact to light touch distally, Tests:  negative Finkelstein's , positive Thumb CMC Grind.      Constitutional   General appearance: Alert and in no  acute distress. Well developed, well nourished.    Eyes   External Eye, Conjunctiva and lids: Normal external exam - pupils were equal in size, round, reactive to light (PERRL) with normal accommodation and extraocular movements intact (EOMI).   Ears, Nose, Mouth, and Throat   Hearing: Normal.   Neck   Neck: No neck mass was observed. Supple.   Pulmonary   Respiratory effort: No respiratory distress.   Cardiovascular   Examination of extremities: No peripheral edema.   Psychiatric   Judgment and insight: Intact.   Orientation to person, place, and time: Alert and oriented x 3.       Mood and affect: Normal.      XR - no fractures, no dislocations, bilateral thumb CMC DJD moderate to severe    X-rays were personally reviewed by me. Radiology reports were reviewed by me as well, if available at the time.      Bilateral thumb CMC DJD  Surgery discussion: I discussed the diagnosis and treatment options with the patient today along with their associated risks and benefits. After thorough discussion, the patient has elected to proceed with surgical intervention. The patient understands the risks of,including, but not limited to, bleeding, infection, loss of life or limb, pain, permanent numbness, tingling, weakness, loss of motion, failure of the goals of surgery or the potential need for additional surgery. The patient would like to accept these risks and proceed. All questions were answered to the patients satisfaction and the patient seems satisfied with the plan.    Plan right thumb CMC arthroplasty with trapeziectomy and ligament reconstruction tendon interposition  Follow-up 2 weeks after no x-ray

## 2025-03-04 ENCOUNTER — ANESTHESIA EVENT (OUTPATIENT)
Dept: OPERATING ROOM | Facility: HOSPITAL | Age: 45
End: 2025-03-04
Payer: COMMERCIAL

## 2025-03-11 ENCOUNTER — PHARMACY VISIT (OUTPATIENT)
Dept: PHARMACY | Facility: CLINIC | Age: 45
End: 2025-03-11
Payer: MEDICARE

## 2025-03-11 ENCOUNTER — ANESTHESIA (OUTPATIENT)
Dept: OPERATING ROOM | Facility: HOSPITAL | Age: 45
End: 2025-03-11
Payer: COMMERCIAL

## 2025-03-11 ENCOUNTER — HOSPITAL ENCOUNTER (OUTPATIENT)
Facility: HOSPITAL | Age: 45
Setting detail: OUTPATIENT SURGERY
Discharge: HOME | End: 2025-03-11
Attending: ORTHOPAEDIC SURGERY | Admitting: ORTHOPAEDIC SURGERY
Payer: COMMERCIAL

## 2025-03-11 ENCOUNTER — APPOINTMENT (OUTPATIENT)
Dept: CARDIOLOGY | Facility: HOSPITAL | Age: 45
End: 2025-03-11
Payer: COMMERCIAL

## 2025-03-11 VITALS
WEIGHT: 133 LBS | DIASTOLIC BLOOD PRESSURE: 86 MMHG | RESPIRATION RATE: 22 BRPM | HEIGHT: 60 IN | BODY MASS INDEX: 26.11 KG/M2 | OXYGEN SATURATION: 95 % | TEMPERATURE: 97.8 F | HEART RATE: 101 BPM | SYSTOLIC BLOOD PRESSURE: 123 MMHG

## 2025-03-11 DIAGNOSIS — M18.0 OSTEOARTHRITIS OF CARPOMETACARPAL (CMC) JOINT OF BOTH THUMBS: Primary | ICD-10-CM

## 2025-03-11 LAB
ATRIAL RATE: 74 BPM
P AXIS: 19 DEGREES
PR INTERVAL: 146 MS
Q ONSET: 252 MS
QRS COUNT: 12 BEATS
QRS DURATION: 83 MS
QT INTERVAL: 371 MS
QTC CALCULATION(BAZETT): 409 MS
QTC FREDERICIA: 396 MS
R AXIS: 11 DEGREES
T AXIS: 43 DEGREES
T OFFSET: 438 MS
VENTRICULAR RATE: 73 BPM

## 2025-03-11 PROCEDURE — 7100000010 HC PHASE TWO TIME - EACH INCREMENTAL 1 MINUTE: Performed by: ORTHOPAEDIC SURGERY

## 2025-03-11 PROCEDURE — 2500000001 HC RX 250 WO HCPCS SELF ADMINISTERED DRUGS (ALT 637 FOR MEDICARE OP): Performed by: ANESTHESIOLOGY

## 2025-03-11 PROCEDURE — 2500000004 HC RX 250 GENERAL PHARMACY W/ HCPCS (ALT 636 FOR OP/ED): Performed by: ORTHOPAEDIC SURGERY

## 2025-03-11 PROCEDURE — 3600000009 HC OR TIME - EACH INCREMENTAL 1 MINUTE - PROCEDURE LEVEL FOUR: Performed by: ORTHOPAEDIC SURGERY

## 2025-03-11 PROCEDURE — 2720000007 HC OR 272 NO HCPCS: Performed by: ORTHOPAEDIC SURGERY

## 2025-03-11 PROCEDURE — 7100000009 HC PHASE TWO TIME - INITIAL BASE CHARGE: Performed by: ORTHOPAEDIC SURGERY

## 2025-03-11 PROCEDURE — RXMED WILLOW AMBULATORY MEDICATION CHARGE

## 2025-03-11 PROCEDURE — 93005 ELECTROCARDIOGRAM TRACING: CPT

## 2025-03-11 PROCEDURE — 2500000005 HC RX 250 GENERAL PHARMACY W/O HCPCS: Performed by: ORTHOPAEDIC SURGERY

## 2025-03-11 PROCEDURE — 26480 TRANSPLANT HAND TENDON: CPT | Performed by: ORTHOPAEDIC SURGERY

## 2025-03-11 PROCEDURE — 2500000004 HC RX 250 GENERAL PHARMACY W/ HCPCS (ALT 636 FOR OP/ED): Performed by: LICENSED PRACTICAL NURSE

## 2025-03-11 PROCEDURE — 3600000004 HC OR TIME - INITIAL BASE CHARGE - PROCEDURE LEVEL FOUR: Performed by: ORTHOPAEDIC SURGERY

## 2025-03-11 PROCEDURE — 25447 ARTHRP NTRCRP/CRP/MTCR NTRPS: CPT | Performed by: ORTHOPAEDIC SURGERY

## 2025-03-11 PROCEDURE — 3700000001 HC GENERAL ANESTHESIA TIME - INITIAL BASE CHARGE: Performed by: ORTHOPAEDIC SURGERY

## 2025-03-11 PROCEDURE — 2500000004 HC RX 250 GENERAL PHARMACY W/ HCPCS (ALT 636 FOR OP/ED): Performed by: ANESTHESIOLOGY

## 2025-03-11 PROCEDURE — 3700000002 HC GENERAL ANESTHESIA TIME - EACH INCREMENTAL 1 MINUTE: Performed by: ORTHOPAEDIC SURGERY

## 2025-03-11 PROCEDURE — 2500000004 HC RX 250 GENERAL PHARMACY W/ HCPCS (ALT 636 FOR OP/ED): Performed by: NURSE ANESTHETIST, CERTIFIED REGISTERED

## 2025-03-11 RX ORDER — PROPOFOL 10 MG/ML
INJECTION, EMULSION INTRAVENOUS AS NEEDED
Status: DISCONTINUED | OUTPATIENT
Start: 2025-03-11 | End: 2025-03-11

## 2025-03-11 RX ORDER — SODIUM CHLORIDE, SODIUM LACTATE, POTASSIUM CHLORIDE, CALCIUM CHLORIDE 600; 310; 30; 20 MG/100ML; MG/100ML; MG/100ML; MG/100ML
20 INJECTION, SOLUTION INTRAVENOUS CONTINUOUS
Status: DISCONTINUED | OUTPATIENT
Start: 2025-03-11 | End: 2025-03-11 | Stop reason: HOSPADM

## 2025-03-11 RX ORDER — FAMOTIDINE 10 MG/ML
20 INJECTION, SOLUTION INTRAVENOUS ONCE
Status: COMPLETED | OUTPATIENT
Start: 2025-03-11 | End: 2025-03-11

## 2025-03-11 RX ORDER — SODIUM CITRATE AND CITRIC ACID MONOHYDRATE 334; 500 MG/5ML; MG/5ML
30 SOLUTION ORAL ONCE
Status: COMPLETED | OUTPATIENT
Start: 2025-03-11 | End: 2025-03-11

## 2025-03-11 RX ORDER — HYDROMORPHONE HYDROCHLORIDE 0.2 MG/ML
0.2 INJECTION INTRAMUSCULAR; INTRAVENOUS; SUBCUTANEOUS EVERY 5 MIN PRN
Status: DISCONTINUED | OUTPATIENT
Start: 2025-03-11 | End: 2025-03-11 | Stop reason: HOSPADM

## 2025-03-11 RX ORDER — ONDANSETRON HYDROCHLORIDE 2 MG/ML
4 INJECTION, SOLUTION INTRAVENOUS ONCE AS NEEDED
Status: DISCONTINUED | OUTPATIENT
Start: 2025-03-11 | End: 2025-03-11 | Stop reason: HOSPADM

## 2025-03-11 RX ORDER — ALBUTEROL SULFATE 0.83 MG/ML
2.5 SOLUTION RESPIRATORY (INHALATION) ONCE
Status: DISCONTINUED | OUTPATIENT
Start: 2025-03-11 | End: 2025-03-11 | Stop reason: HOSPADM

## 2025-03-11 RX ORDER — CLINDAMYCIN PHOSPHATE 900 MG/50ML
900 INJECTION, SOLUTION INTRAVENOUS ONCE
Status: COMPLETED | OUTPATIENT
Start: 2025-03-11 | End: 2025-03-11

## 2025-03-11 RX ORDER — FENTANYL CITRATE 50 UG/ML
INJECTION, SOLUTION INTRAMUSCULAR; INTRAVENOUS AS NEEDED
Status: DISCONTINUED | OUTPATIENT
Start: 2025-03-11 | End: 2025-03-11

## 2025-03-11 RX ORDER — ROPIVACAINE HYDROCHLORIDE 5 MG/ML
INJECTION, SOLUTION EPIDURAL; INFILTRATION; PERINEURAL AS NEEDED
Status: DISCONTINUED | OUTPATIENT
Start: 2025-03-11 | End: 2025-03-11

## 2025-03-11 RX ORDER — ONDANSETRON HYDROCHLORIDE 2 MG/ML
4 INJECTION, SOLUTION INTRAVENOUS ONCE
Status: COMPLETED | OUTPATIENT
Start: 2025-03-11 | End: 2025-03-11

## 2025-03-11 RX ORDER — MIDAZOLAM HYDROCHLORIDE 1 MG/ML
INJECTION, SOLUTION INTRAMUSCULAR; INTRAVENOUS AS NEEDED
Status: DISCONTINUED | OUTPATIENT
Start: 2025-03-11 | End: 2025-03-11

## 2025-03-11 RX ORDER — OXYCODONE AND ACETAMINOPHEN 5; 325 MG/1; MG/1
1 TABLET ORAL EVERY 6 HOURS PRN
Qty: 28 TABLET | Refills: 0 | Status: SHIPPED | OUTPATIENT
Start: 2025-03-11

## 2025-03-11 RX ORDER — LIDOCAINE HYDROCHLORIDE AND EPINEPHRINE 10; 10 UG/ML; MG/ML
INJECTION, SOLUTION INFILTRATION; PERINEURAL AS NEEDED
Status: DISCONTINUED | OUTPATIENT
Start: 2025-03-11 | End: 2025-03-11

## 2025-03-11 RX ADMIN — SODIUM CHLORIDE, POTASSIUM CHLORIDE, SODIUM LACTATE AND CALCIUM CHLORIDE: 600; 310; 30; 20 INJECTION, SOLUTION INTRAVENOUS at 10:18

## 2025-03-11 RX ADMIN — FENTANYL CITRATE 100 MCG: 50 INJECTION INTRAMUSCULAR; INTRAVENOUS at 09:56

## 2025-03-11 RX ADMIN — PROPOFOL 250 MG: 10 INJECTION, EMULSION INTRAVENOUS at 10:22

## 2025-03-11 RX ADMIN — FENTANYL CITRATE 50 MCG: 50 INJECTION INTRAMUSCULAR; INTRAVENOUS at 10:24

## 2025-03-11 RX ADMIN — DEXAMETHASONE SODIUM PHOSPHATE 4 MG: 4 INJECTION INTRA-ARTICULAR; INTRALESIONAL; INTRAMUSCULAR; INTRAVENOUS; SOFT TISSUE at 10:03

## 2025-03-11 RX ADMIN — MIDAZOLAM 2 MG: 1 INJECTION INTRAMUSCULAR; INTRAVENOUS at 09:56

## 2025-03-11 RX ADMIN — CLINDAMYCIN PHOSPHATE 900 MG: 900 INJECTION, SOLUTION INTRAVENOUS at 10:18

## 2025-03-11 RX ADMIN — LIDOCAINE HYDROCHLORIDE,EPINEPHRINE BITARTRATE 5 ML: 10; .01 INJECTION, SOLUTION INFILTRATION; PERINEURAL at 10:03

## 2025-03-11 RX ADMIN — SODIUM CITRATE AND CITRIC ACID MONOHYDRATE 30 ML: 500; 334 SOLUTION ORAL at 09:23

## 2025-03-11 RX ADMIN — FAMOTIDINE 20 MG: 10 INJECTION, SOLUTION INTRAVENOUS at 09:23

## 2025-03-11 RX ADMIN — ROPIVACAINE HYDROCHLORIDE 15 ML: 5 INJECTION, SOLUTION EPIDURAL; INFILTRATION; PERINEURAL at 10:03

## 2025-03-11 RX ADMIN — ONDANSETRON 4 MG: 2 INJECTION INTRAMUSCULAR; INTRAVENOUS at 09:23

## 2025-03-11 RX ADMIN — FENTANYL CITRATE 50 MCG: 50 INJECTION INTRAMUSCULAR; INTRAVENOUS at 10:22

## 2025-03-11 SDOH — HEALTH STABILITY: MENTAL HEALTH: CURRENT SMOKER: 0

## 2025-03-11 ASSESSMENT — COLUMBIA-SUICIDE SEVERITY RATING SCALE - C-SSRS
6. HAVE YOU EVER DONE ANYTHING, STARTED TO DO ANYTHING, OR PREPARED TO DO ANYTHING TO END YOUR LIFE?: NO
1. IN THE PAST MONTH, HAVE YOU WISHED YOU WERE DEAD OR WISHED YOU COULD GO TO SLEEP AND NOT WAKE UP?: NO
2. HAVE YOU ACTUALLY HAD ANY THOUGHTS OF KILLING YOURSELF?: NO

## 2025-03-11 ASSESSMENT — PAIN SCALES - GENERAL
PAINLEVEL_OUTOF10: 0 - NO PAIN
PAIN_LEVEL: 0
PAINLEVEL_OUTOF10: 0 - NO PAIN

## 2025-03-11 ASSESSMENT — PAIN - FUNCTIONAL ASSESSMENT: PAIN_FUNCTIONAL_ASSESSMENT: 0-10

## 2025-03-11 NOTE — ANESTHESIA POSTPROCEDURE EVALUATION
Patient: Cele George    Procedure Summary       Date: 03/11/25 Room / Location: [facility] OR 06 / Virtual [facility] OR    Anesthesia Start: 1015 Anesthesia Stop: 1116    Procedures:       right carpal metacarpal arthroplasty (mac/local) (Right: Thumb)      . (Right: Thumb) Diagnosis:       Osteoarthritis of carpometacarpal (CMC) joint of both thumbs      (Osteoarthritis of carpometacarpal (CMC) joint of both thumbs [M18.0])    Surgeons: Jese Klein DO Responsible Provider: LILIANA Vann    Anesthesia Type: MAC ASA Status: 2            Anesthesia Type: MAC    Vitals Value Taken Time   /86 03/11/25 1140   Temp 36.6 °C (97.8 °F) 03/11/25 1145   Pulse 101 03/11/25 1150   Resp 22 03/11/25 1150   SpO2 95 % 03/11/25 1150       Anesthesia Post Evaluation    Patient location during evaluation: PACU  Patient participation: complete - patient participated  Level of consciousness: awake and alert  Pain score: 0  Pain management: adequate  Multimodal analgesia pain management approach  Airway patency: patent  Cardiovascular status: hemodynamically stable  Respiratory status: room air  Hydration status: acceptable  Postoperative Nausea and Vomiting: none    No notable events documented.

## 2025-03-11 NOTE — DISCHARGE INSTRUCTIONS
[facility]  [phone]   Fax: [phone] [address]    [address]  [address] [place], [place] [address] [place] [id number]    Upper Extremity   Post-Operatively (After Surgery) - Thumb Arthritis Surgery    1. Post-Operative Course    Following surgery, your surgeon will see your family in the family call room. Once stable, and in the Post Anesthesia Care Unit (PACU), you will be transported to your hospital room or allowed to go home if an outpatient procedure.     2. Dressing    You have a splint (hard casting material), do NOT remove the dressing until follow up. DO NOT GET DRESSING WET! Once at home, if your dressing, splint, or cast feels too tight or mistakenly gets wet, please contact the office. Further dressing instructions may be given at surgery.     If your sutures are visible (black strings), they will be removed about 10-14 days after surgery.  If you do not see any sutures, then they are absorbable and will not need to be removed.  In either case, you should have an appointment to see your physician 10-14 after surgery.    3. Activity     Most people underestimate the length of time required to fully recover from surgery. If you had a block (where your arm feels numb), the sensation typically returns around 18-24 hours later.  It is recommended you take some pain medication the evening following your surgery so when the block wears off (in the middle of the night), you are not in severe pain.   With pain medication, start at the lowest dose that controls your pain.       After the first 1-3 days, we encourage you to balance your activity between ambulation, sitting in a chair,   and resting in bed with your arm elevated. Let swelling and pain be your guide to activity, you should   avoid prolonged (over 20 minutes) standing or sitting. In general, limit your activities to home for the first   1-3 days.    4. Pain    You will be discharged  to home with a prescription for oral pain medication. A most important factor in pain control is rest and elevation. Ice may also be applied to the operative site for 30 minute intervals. Please use a waterproof bag for ice or cold packs.  Again, as you feel the numbness resolving and some onset of discomfort, please take pain medication, don't wait till full resolution of block.   Try to use the lowest dose of pain medication that controls your pain.      The pain medication may cause constipation. Drink plenty of fluids, eat fruits and vegetables. You may use an over the counter laxative or stool softener if necessary. Take pain medication with some food in your stomach, as prescribed by your pharmacist.     5. Elevation   Elevation of the operative extremity is critical. Elevation reduces swelling and minimizes pain. Less swelling is associated with a lower infectious rate, fewer wound complications, less post-operative stiffness, and more rapid recovery of function. To keep the swelling down, your hand must be kept above the level of your heart.

## 2025-03-11 NOTE — ANESTHESIA PREPROCEDURE EVALUATION
Patient: Cele George    Procedure Information       Date/Time: 03/11/25 1015    Procedures:       right carpal metacarpal arthroplasty (mac/local) (Right: Thumb) - local/mac 30 minutes, no special, 2g ancef      . (Right: Thumb) - Local/mac 30 minutes, 2g ancef, no special    Location: [facility] OR 06 / Virtual [facility] OR    Surgeons: Jese Klein, DO            Relevant Problems   Anesthesia (within normal limits)      Cardiac (within normal limits)      Pulmonary (within normal limits)      Neuro   (+) Anxiety   (+) Depression      Liver (within normal limits)      Musculoskeletal   (+) Osteoarthritis of carpometacarpal (CMC) joint of both thumbs       Clinical information reviewed:   Tobacco  Allergies  Meds  Problems  Med Hx  Surg Hx   Fam Hx  Soc   Hx        NPO Detail:  NPO/Void Status  Date of Last Liquid: 03/10/25  Time of Last Liquid: 2200  Date of Last Solid: 03/10/25  Time of Last Solid: 2200  Last Intake Type: Clear fluids         Physical Exam    Airway  Mallampati: II  TM distance: >3 FB  Neck ROM: full     Cardiovascular   Rhythm: regular  Rate: normal     Dental    Pulmonary - normal exam     Abdominal            Anesthesia Plan    History of general anesthesia?: yes  History of complications of general anesthesia?: no    ASA 2     The patient is not a current smoker.    intravenous induction   Anesthetic plan and risks discussed with patient.  Use of blood products discussed with patient who consented to blood products.    Plan discussed with CRNA.

## 2025-03-11 NOTE — ANESTHESIA PROCEDURE NOTES
"Peripheral Block    Patient location during procedure: pre-op  Start time: 3/11/2025 9:57 AM  End time: 3/11/2025 10:03 AM  Reason for block: at surgeon's request and post-op pain management  Staffing  Performed: CRNA   Authorized by: LILIANA Ridley    Performed by: LILIANA Ridley  Preanesthetic Checklist  Completed: patient identified, IV checked, site marked, risks and benefits discussed, surgical consent, monitors and equipment checked, pre-op evaluation and timeout performed   Timeout performed at: 3/11/2025 9:55 AM  Peripheral Block  Patient position: sitting  Prep: ChloraPrep  Patient monitoring: heart rate, cardiac monitor and continuous pulse ox  Block type: supraclavicular  Laterality: right  Injection technique: single-shot  Guidance: ultrasound guided  Local infiltration: ropivacaine  Infiltration strength: 0.5 %  Dose: 15 mL  Needle  Needle gauge: 20 G  Needle localization: ultrasound guidance  Assessment  Injection assessment: negative aspiration for heme, no paresthesia on injection, incremental injection and local visualized surrounding nerve on ultrasound  Paresthesia pain: none  Heart rate change: no  Slow fractionated injection: yes  Additional Notes  Anesthesia consult was placed by Dr. Klein for post procedural analgesia.  The patient's chart was reviewed and they were deemed an appropriate candidate for the procedure. The patient was educated in detail on the risks, benefits, and alternatives to the block including but not limited to: temporary or permanent nerve damage, bleeding, infection, damage to surrounding tissues, possible block failure and the potential for local anesthesia toxicity syndrome.  The patient expressed understanding and all questions were answered prior to the initiation of the procedure.  Informed consent was also signed by the patient and laterality determined per institutional policy.  A formal \"time out \"consistent with the institutions " rules and regulations was performed by the anesthesia provider and appropriate RN.     Procedure  The patient was placed in the sitting position. The RIGHT side was marked and skin prep applied and allowed to dry. Proper monitors were applied with oxygen.  Sedation was provided by administering     Versed 2 mg IV  Fentanyl 100 mcg IV    A high frequency linear probe with probe cover and was placed over lateral neck and subclavian artery, brachial plexus identified using sterile coupling gel. The brachial plexus was identified posterior to the subclavian artery as well as the pleura. An ECHOGENIC BLOCK NEEDLE was then advanced maintaining an in-plane visualization of the procedure, under ultrasound guidance from lateral to medial to come adjacent to, but avoiding contact to the brachial plexus itself.. Upon negative aspiration, 5ml 1% lidocaine 15 mls 0.5% Ropivacaine with 4 mg decadron preservative free was administered safely and cautiously between the muscle planes. Aspiration every 3-5mls was done to avoid potential intravascular injection.  All injections were done without resistance and free of blood.  All relevant vasculature was carefully avoided throughout the procedure. The patient tolerated the procedure well without report of intense pain, tinnitus, metallic taste or circumoral numbness.  The block was then evaluated a few minutes and appeared to be functioning appropriately.

## 2025-03-11 NOTE — OP NOTE
ORTHOPEDIC OPERATIVE NOTE    Name: Cele George  : 1980  Surgeon: Diogo Klein DO  Facility: [facility]  Date of Surgery: 25     SURGEON:     Diogo Klein DO  PRE OP DIAGNOSIS:                    Right thumb carpometacarpal osteoarthritis  POST OP DIAGNOSIS:  Same  PROCEDURE:   Right thumb carpometacarpal arthroplasty with FCR tendon transfer suspension plasty.     ANESTHESIA:  Regional Sedation  ASSISTANT:   Reilly ALLAN  BLOOD LOSS: Minimal    INDICATIONS FOR PROCEDURE: The patient is a 44 y.o. -year-old female who has failed nonsurgical management for osteoarthritis of the left thumb CMC joint including corticosteroid injections. He has been fully informed of the risks and benefits of the procedure and elected to undergo the surgery.    PROCEDURE IN DETAIL: The patient was seen and consented preoperatively with the side and site of surgery appropriately marked. The patient was taken back to operative suite, placed supine on the operative table, and placed on monitor for the duration of the case.  The patient was administered sedation and monitored throughout the entire surgery by Department of Anesthesia. While sedated, the right upper extremity was sterilely prepped and draped in the sterile orthopedic fashion, elevated with an Esmarch, tourniquet inflated to 250 mmHg for duration of case. A time-out was performed confirming the site of surgery and surgery to be performed.    Pfannenstiel incision was made to the radial aspect of the thumb over the CMC joint. Skin and underlying tissues were sharply dissected down. Hemostasis maintained with bipolar  electrocauterization. Branches of superficial radial nerve isolated and protected throughout the approach. Radial artery was then freed up with and perforating branches coagulated with electrocautery. The capsulectomy was carried out dorsally and volarly to evaluate the CMC joint. Significant osteoarthritis present at  the joint. The trapezium was then transected and removed in its entirety, confirmed visually as well as palpably.    The base of the thumb metacarpal was then removed using a sagittal saw, taking off a wafer of bone. A radial sided theo hole was created with a theo an the base of the metacarpal and another through the direct proximal base of the metacarpal centrally located.    Two separate 1-cm transverse incision were made at the forearm proximally over the FCR using a scalpel, skin and underlying tissues were sharply dissected down. Half of the FCR was then transected and brought out through the distal incision and was placed through the base of the first metacarpal through the above theo holes and woven into itself with suspension plasty using 3-0 supramid. The remaining tendon was then woven as an anchovy and sutured to the volar capsule as an interposition using 3-0 supramid. This resulted in good position of thumb. No evidence of impingement. The STT joint had been evaluated. No evidence of osteoarthritis was present.    The wound was irrigated with sterile saline. The capsule was closed with 3-0 Supramid. Deep tissue closed with 4-0 vicryl.  The skin was then closed with 5-0 nylon suture. Sterile dressing was placed of Xeroform and 4x4s, affixed with Kerlix. The patient was placed in well-padded thumb spica splint. Tourniquet was deflated. Adequate perfusion returned to the hand.  The patient was taken to PACU in satisfactory condition.    Electronically signed  Diogo Klein DO

## 2025-03-14 ENCOUNTER — TELEPHONE (OUTPATIENT)
Dept: ORTHOPEDIC SURGERY | Facility: CLINIC | Age: 45
End: 2025-03-14
Payer: COMMERCIAL

## 2025-03-14 DIAGNOSIS — G56.03 BILATERAL CARPAL TUNNEL SYNDROME: Primary | ICD-10-CM

## 2025-03-14 RX ORDER — TRAMADOL HYDROCHLORIDE 50 MG/1
50 TABLET ORAL EVERY 6 HOURS PRN
Qty: 21 TABLET | Refills: 0 | Status: SHIPPED | OUTPATIENT
Start: 2025-03-14 | End: 2025-03-19

## 2025-03-14 NOTE — TELEPHONE ENCOUNTER
Patient had R ECTR done on 3/11/25. She would like a refill of the Percocet 5/325mg to be sent to the [facility] in [place]. She only has a couple of pills left.

## 2025-03-19 ENCOUNTER — TELEPHONE (OUTPATIENT)
Dept: ORTHOPEDIC SURGERY | Facility: HOSPITAL | Age: 45
End: 2025-03-19
Payer: COMMERCIAL

## 2025-03-26 ENCOUNTER — EVALUATION (OUTPATIENT)
Dept: OCCUPATIONAL THERAPY | Facility: CLINIC | Age: 45
End: 2025-03-26
Payer: COMMERCIAL

## 2025-03-26 ENCOUNTER — APPOINTMENT (OUTPATIENT)
Dept: ORTHOPEDIC SURGERY | Facility: CLINIC | Age: 45
End: 2025-03-26
Payer: COMMERCIAL

## 2025-03-26 VITALS — HEIGHT: 60 IN | WEIGHT: 130 LBS | BODY MASS INDEX: 25.52 KG/M2

## 2025-03-26 DIAGNOSIS — M79.644 PAIN OF RIGHT THUMB: ICD-10-CM

## 2025-03-26 DIAGNOSIS — R53.1 WEAKNESS: Primary | ICD-10-CM

## 2025-03-26 DIAGNOSIS — M25.60 STIFFNESS IN JOINT: ICD-10-CM

## 2025-03-26 DIAGNOSIS — M18.0 OSTEOARTHRITIS OF CARPOMETACARPAL (CMC) JOINT OF BOTH THUMBS: Primary | ICD-10-CM

## 2025-03-26 PROCEDURE — 3008F BODY MASS INDEX DOCD: CPT | Performed by: ORTHOPAEDIC SURGERY

## 2025-03-26 PROCEDURE — 1036F TOBACCO NON-USER: CPT | Performed by: ORTHOPAEDIC SURGERY

## 2025-03-26 PROCEDURE — L3808 WHFO, RIGID W/O JOINTS: HCPCS | Performed by: OCCUPATIONAL THERAPIST

## 2025-03-26 PROCEDURE — 99024 POSTOP FOLLOW-UP VISIT: CPT | Performed by: ORTHOPAEDIC SURGERY

## 2025-03-26 PROCEDURE — 97165 OT EVAL LOW COMPLEX 30 MIN: CPT | Mod: GO | Performed by: OCCUPATIONAL THERAPIST

## 2025-03-26 ASSESSMENT — ENCOUNTER SYMPTOMS
DEPRESSION: 0
LOSS OF SENSATION IN FEET: 0
OCCASIONAL FEELINGS OF UNSTEADINESS: 0

## 2025-03-26 ASSESSMENT — PAIN SCALES - GENERAL
PAINLEVEL_OUTOF10: 4
PAINLEVEL_OUTOF10: 6

## 2025-03-26 ASSESSMENT — PATIENT HEALTH QUESTIONNAIRE - PHQ9
1. LITTLE INTEREST OR PLEASURE IN DOING THINGS: NOT AT ALL
2. FEELING DOWN, DEPRESSED OR HOPELESS: NOT AT ALL
SUM OF ALL RESPONSES TO PHQ9 QUESTIONS 1 AND 2: 0

## 2025-03-26 ASSESSMENT — PAIN - FUNCTIONAL ASSESSMENT
PAIN_FUNCTIONAL_ASSESSMENT: 0-10
PAIN_FUNCTIONAL_ASSESSMENT: 0-10

## 2025-03-26 NOTE — PROGRESS NOTES
Occupational Therapy    Evaluation/Treatment    Patient Name: Cele George  MRN: [id number]  : 1980  Today's Date: 3/26/2025     Time Calculation  Start Time: 1200  Stop Time: 1230  Time Calculation (min): 30 min  Visit Number: 1  Therapeutic Procedure Codes:  OT Evaluation Time Entry  OT Evaluation (Low) Time Entry: 15             Subjective   Current Problem:  1. Weakness        2. Pain of right thumb  Referral to Occupational Therapy    Follow Up In Occupational Therapy      3. Stiffness in joint          General:   OT Received On: 25  General  Reason for Referral: Splint Fabrication -Evaluate and Treat  Referred By: Dr. Klein  Pt reporting she had been having pain in thumb since . Pt stating she tried over the counter splints and Cortizone injections, reporting these did not work. Elected for surgical management on 3/11/2025, Right thumb carpometacarpal arthroplasty with FCR tendon transfer suspension plasty. Pt referred to skilled OT for evaluation/treatment and splint fabrication.      R hand dominant/R injury     Precautions:  Splinting: Patient to wear splint per discussion with therapist,  I have reviewed patients medical history form.     Pain:  Pain Assessment  Pain Assessment: 0-10  0-10 (Numeric) Pain Score: 6      Objective   Home Living:   Lives with spouse.   Prior Function:   IND  Works: office job  ADL:   IND - one handed, increased time.    Splinting:  The patient was provided with a custom fabricated thumb spica. The splint is forearm based. Wear, care and precautions were reviewed with the patient indicating an understanding.  The patient was encouraged to maximize ROM of unrestricted joints without pushing pain. Handouts provided to the patient.       Sensation:  Intact, pt reporting just having pain.     Outcome Measures:   Quickdash Scores: 84.09%    Therapeutic exercises/activities:  DOS: 3/11/2025 Post-op: 2 weeks    3/26/2025   Exercises: Reps:                    Activities:                    Modalities:        Orthotic Splint fabrication - see section above.            Manual:                    Functional review:     Completed on: 3/26/2025 OT evaluation      OP EDUCATION:  Education  Individual(s) Educated: Patient  Education Provided: Diagnosis & Precautions, Symptom management, Orthotics wearing schedule and precautions, Orthotics and/or prosthetic trainging, Risk and benefits of OT discussed with patient or other, POC discussed and agreed upon  Home Program: Orthotic wearing schedule, care and precautions, AROM, Handout issued  Equipment: [place]ette, Orthotic(s)  Risk and Benefits Discussed with Patient/Caregiver/Other: yes  Patient/Caregiver Demonstrated Understanding: yes  Plan of Care Discussed and Agreed Upon: yes  Patient Response to Education: Patient/Caregiver Verbalized Understanding of Information    Assessment:   OT Assessment Results: Decreased ADL status, Decreased upper extremity range of motion, Decreased upper extremity strength  Pt is a 43 y/o F who presents to this facility with performance deficits in ROM, strength, positioning, sensation, and pain limiting ability to complete ADL and IADL tasks. The patient reports a good supportive fit from the splint. The patient indicates an understanding of splint wear, care and precautions and indicates an understanding of the need to achieve maximum ROM of all unrestricted joints. Handouts provided to the patient.     Plan:     Rehab Potential: Good  Plan of Care Agreement: Patient  Pt to be seen 1 x per week for 12 weeks.  Occupational therapy intervention plan to include education/instruction, hot pack, ultrasound, manual therapy, orthotic fitting/training, self-care/home management, therapeutic exercises, therapeutic activities, and home program.     Goals:  Active       OT Goals       LTG - Patient will indicate/ demonstrate the ability to resume all preinjury ADLs and IADLs without significant limits  secondary to decreased ROM, decreased strength and/or pain as indicated by Quickdash score of less than 15%.        Start:  03/26/25    Expected End:  06/18/25            Develop and issue HEP to help maximize ROM, strength and tolerance to help maximize return to all pre-onset activities.        Start:  03/26/25    Expected End:  06/18/25            Patient will indicate/ demonstrate an understanding of splint wear, care and precautions.        Start:  03/26/25    Expected End:  06/18/25            The patient will indicate/demonstrate protective and supportive position of R wrist and thumb via splinting to help maximize support and the patients ability to participate in IADLs.        Start:  03/26/25    Expected End:  06/18/25            Patient will demonstrate a progressive increase in ROM as appropriate with R wrist and thumb to be within 5-10 degrees of L to help patient resume normal ADL and IADL function.        Start:  03/26/25    Expected End:  06/18/25               OT Goals       Pain to be less than or equal to 1/10 with greater than or equal to 45 minutes activity.        Start:  03/26/25    Expected End:  06/18/25              [employer] Authorization Information    CPT CODES:  THERE-EX  (21007), THERE-ACT (76268), MANUAL (25432), and ULTRASOUND (47266) will pend approval Orthotic fitting and training.  Functional outcome tools: Quick Dash  Raw Score: 48  Is this request to provide: None of the above  Evaluation complextiy: Low  Did the patient have a surgical procedure in the last three months related to the condition for which services are being requested: Yes  Select all conditions that apply: Musculoskeletal disorders

## 2025-03-26 NOTE — PROGRESS NOTES
Patient is being seen today for a F/U for right carpal metacarpal arthroplasty. Patient is exhibiting pain in the right wrist with movement. Takes Advil dual action for pain slight improvement. Denies numbness and tingling. No other concerns.

## 2025-03-26 NOTE — PROGRESS NOTES
44 y.o. female presents today for for follow up after right thumb CMC arthroplasty. The patient reports doing well, symptoms improving. The DOS was 2 weeks ago. Pain is controlled. Patient denies numbness and tingling.  Splint has been worn as directed.       Review of Systems    Constitutional: see HPI, no fever, no chills, not feeling tired, no significant weight gain or weight loss.   Eyes: No vision changes  ENT: no nosebleeds.   Cardiovascular: no chest pain.   Respiratory: no shortness of breath and no cough.   Gastrointestinal: no abdominal pain, no nausea, no vomiting and no diarrhea.   Musculoskeletal: per HPI  Neurological: no headache, no gait disturbances  Psychiatric: no depression and no sleep disturbances.   Endocrine: no muscle weakness and no muscle cramps.   Hematologic/Lymphatic: no swollen glands and no tendency for easy bruising or excessive swelling.     Patient's past medical history, past surgical history, allergies, and medications have been reviewed unless otherwise noted in the chart.     Post-Op Exam  Inspection:  no evidence of infection, no erythema, Palpation:  compartments are soft, Range of Motion:  not tested, Stability:  not tested, Strength:  not tested, Skin:  incision site clean, dry and intact, healing without complication, Vascular:  capillary refill <2 seconds distally, Sensation:  intact to light touch distally.    Constitutional   General appearance: Alert and in no acute distress. Well developed, well nourished.    Eyes   External Eye, Conjunctiva and lids: Normal external exam - pupils were equal in size, round, reactive to light (PERRL) with normal accommodation and extraocular movements intact (EOMI).   Ears, Nose, Mouth, and Throat   Hearing: Normal.   Neck   Neck: No neck mass was observed. Supple.   Pulmonary   Respiratory effort: No respiratory distress.   Cardiovascular   Examination of extremities: No peripheral edema.   Psychiatric   Judgment and insight: Intact.    Orientation to person, place, and time: Alert and oriented x 3.       Mood and affect: Normal.      2 weeks status post right thumb CMC arthroplasty  Based on the history, physical exam and imaging studies above, the patient's presentation is consistent with the above diagnosis.  I had a long discussion with the patient regarding their presentation and the treatment options.    We again discussed her treatment options going forward along with their associated risks and benefits. After thorough discussion, the patient has elected to proceed with postoperative care. All questions were answered to the patients satisfaction who seems satisfied with the plan.  They will call the office with any questions/concerns.     Sutures removed  Steris  Vitamin E cream prn to scar tissue  Occupational Therapy evaluation and treatment, forearm-based thumb spica full-time x 4 weeks  Follow-up 4 weeks no x-ray

## 2025-03-31 ENCOUNTER — TELEPHONE (OUTPATIENT)
Dept: ORTHOPEDIC SURGERY | Facility: CLINIC | Age: 45
End: 2025-03-31
Payer: COMMERCIAL

## 2025-03-31 NOTE — TELEPHONE ENCOUNTER
Patient had R thumb CMC arthroplasty done on 3/11. She called in stating that she is now experiencing tingling and numbness in her thumb and wanted to make sure that it was normal.     Please advise

## 2025-04-02 ENCOUNTER — TELEPHONE (OUTPATIENT)
Dept: ORTHOPEDIC SURGERY | Facility: CLINIC | Age: 45
End: 2025-04-02
Payer: COMMERCIAL

## 2025-04-02 ENCOUNTER — TREATMENT (OUTPATIENT)
Dept: OCCUPATIONAL THERAPY | Facility: CLINIC | Age: 45
End: 2025-04-02
Payer: COMMERCIAL

## 2025-04-02 DIAGNOSIS — M25.60 STIFFNESS IN JOINT: ICD-10-CM

## 2025-04-02 DIAGNOSIS — R53.1 WEAKNESS: Primary | ICD-10-CM

## 2025-04-02 DIAGNOSIS — M79.644 PAIN OF RIGHT THUMB: ICD-10-CM

## 2025-04-02 PROCEDURE — 97110 THERAPEUTIC EXERCISES: CPT | Mod: GO | Performed by: OCCUPATIONAL THERAPIST

## 2025-04-02 RX ORDER — CLINDAMYCIN HYDROCHLORIDE 150 MG/1
150 CAPSULE ORAL 2 TIMES DAILY
Qty: 20 CAPSULE | Refills: 0 | Status: SHIPPED | OUTPATIENT
Start: 2025-04-02 | End: 2025-04-12

## 2025-04-02 ASSESSMENT — PAIN SCALES - GENERAL: PAINLEVEL_OUTOF10: 2

## 2025-04-02 NOTE — PROGRESS NOTES
Occupational Therapy    OT Treatment    Patient Name: Cele George  MRN: [id number]  Today's Date: 4/2/2025     Time Calculation  Start Time: 1400  Stop Time: 1445  Time Calculation (min): 45 min    Visit Number: 2  Therapeutic Procedures:      OT Therapeutic Procedures Time Entry  Therapeutic Exercise Time Entry: 45                         Current Problem:  1. Pain of right thumb  Follow Up In Occupational Therapy          Subjective     General:  OT Received On: 04/02/25  Client reported overall soreness with hand at base of thumb and wrist. Client reported wound was looking infected and was concerned about it. Additionally, client reported recent tingling/numbness at dorsal aspect of thumb which started about a week ago.      Pain:  Pain Assessment  0-10 (Numeric) Pain Score: 2        Objective       Therapy/Activity:   DOS: 3/11/2025 Post-op: 2 weeks Post-op: 3 weeks     3/26/2025 4/3/2025   Exercises: Reps:    Wrist AROM  Flex/ext 1x5  RD/UD 1x5  Sup/pron 1x5   IP joint blocking AROM  1x20             Activities:                         Modalities:          Orthotic Splint fabrication - see section above.               Manual:     STM   STM given to volar aspect of wrist and forearm avoiding the thumb incision based upon weeping                  Functional review:      Completed on: 3/26/2025 OT evaluation      Wrist Measurements:  WFL unless documented below   Flexion  Extension Radial Dev Ulnar Dev Supination Pronation   Right 15 45 11 25 90 85   Left 70 60 30 35 85 90      Thumb Measurements:   IP   Right 0/42   Left +14/60      OP EDUCATION:  Education  Individual(s) Educated: Patient  Education Provided: Diagnosis & Precautions, Symptom management     Assessment:   Pt participated in therapeutic exercises and activities as needed to improve R wrist/IP thumb ROM and de-sensitization at volar aspect of wrist/forearm for improved functional usage. Measurements were taken demonstrating a lack of R wrist  ROM for flexion, extension, RD, and UD. Additionally the R thumb IP demonstrated deficits in mainly flexion. Client was seen by doctor to check wound for infection and sent down to office after OT session to receive needed care for treatment of incision site inflammation. Client demonstrated moderate muscle guarding with STM.      Plan:   Pt to continue addressing R wrist/thumb ROM and hypersensitivity for improved ADL/IADL task completion.      Goals:  Active       OT Goals       LTG - Patient will indicate/ demonstrate the ability to resume all preinjury ADLs and IADLs without significant limits secondary to decreased ROM, decreased strength and/or pain as indicated by Quickdash score of less than 15%.        Start:  03/26/25    Expected End:  06/18/25            Develop and issue HEP to help maximize ROM, strength and tolerance to help maximize return to all pre-onset activities.        Start:  03/26/25    Expected End:  06/18/25            Patient will indicate/ demonstrate an understanding of splint wear, care and precautions.        Start:  03/26/25    Expected End:  06/18/25            The patient will indicate/demonstrate protective and supportive position of R wrist and thumb via splinting to help maximize support and the patients ability to participate in IADLs.        Start:  03/26/25    Expected End:  06/18/25            Patient will demonstrate a progressive increase in ROM as appropriate with R wrist and thumb to be within 5-10 degrees of L to help patient resume normal ADL and IADL function.        Start:  03/26/25    Expected End:  06/18/25               OT Goals       Pain to be less than or equal to 1/10 with greater than or equal to 45 minutes activity.        Start:  03/26/25    Expected End:  06/18/25                    The supervising therapist was present for the entire session and made all clinical decisions.

## 2025-04-09 ENCOUNTER — TREATMENT (OUTPATIENT)
Dept: OCCUPATIONAL THERAPY | Facility: CLINIC | Age: 45
End: 2025-04-09
Payer: COMMERCIAL

## 2025-04-09 DIAGNOSIS — M25.60 STIFFNESS IN JOINT: Primary | ICD-10-CM

## 2025-04-09 DIAGNOSIS — M79.644 PAIN OF RIGHT THUMB: ICD-10-CM

## 2025-04-09 PROCEDURE — 97140 MANUAL THERAPY 1/> REGIONS: CPT | Mod: GO | Performed by: OCCUPATIONAL THERAPIST

## 2025-04-09 PROCEDURE — 97110 THERAPEUTIC EXERCISES: CPT | Mod: GO | Performed by: OCCUPATIONAL THERAPIST

## 2025-04-09 ASSESSMENT — PAIN SCALES - GENERAL: PAINLEVEL_OUTOF10: 8

## 2025-04-09 NOTE — PROGRESS NOTES
Occupational Therapy    OT Treatment    Patient Name: Cele George  MRN: [id number]  Today's Date: 4/9/2025     Time Calculation  Start Time: 1300  Stop Time: 1345  Time Calculation (min): 45 min    Visit Number: 3  Therapeutic Procedures:      OT Therapeutic Procedures Time Entry  Manual Therapy Time Entry: 15  Therapeutic Exercise Time Entry: 30                         Current Problem:  1. Pain of right thumb  Follow Up In Occupational Therapy          Subjective     General:  OT Received On: 04/09/25  Client reported infection has been healing but still has some weeping at upper incision part. Client reported rubbing within splint causing pain.      Pain:  Pain Assessment  0-10 (Numeric) Pain Score: 8   when rubbing against brace    Objective       Therapy/Activity:   DOS: 3/11/2025 Post-op: 2 weeks Post-op: 3 weeks  Post-op: 4 weeks    3/26/2025 4/3/2025 4/9/2025   Exercises: Reps:     Wrist AROM  Wrist AAROM  Flex/ext 1x5  RD/UD 1x5  Sup/pron 1x5 AROM  1x10  1x10  1x10    AAROM  1x5  1x5  1x5   IP joint blocking AROM  1x20 1x15   Digit opposition    1x10   MP joint blocking AROM   1x10   Activities:                              Modalities:            Orthotic Splint fabrication - see section above.                  Manual:      STM   STM given to volar aspect of wrist and forearm avoiding the thumb incision based upon weeping STM given to volar aspect of wrist and forearm avoiding the thumb incision based upon weeping x15 min                     Functional review:       Completed on: 3/26/2025 OT evaluation       Thumb Measurements:   MP   Left 60/0   Right 20/0      OP EDUCATION:  Education  Individual(s) Educated: Patient  Education Provided: Anatomy & Physiology, Diagnosis & Precautions, Symptom management  Home Program: AROM, AAROM    Assessment:   Pt participated in therapeutic exercises and activities as needed to improve R thumb ROM and scar sensitivity. Client tolerated AAROM with wrist and new R  thumb AROM exercises well. Client's splint was adjusted to accommodate rubbing at incision site to ease pain/discomfort. Measurements were taken at MP joint demonstrating a lack of ROM on the right compared to the left.     Plan:   Pt to continue addressing R thumb ROM and scar sensitivity to improve functional usage and progress goals.      Goals:  Active       OT Goals       LTG - Patient will indicate/ demonstrate the ability to resume all preinjury ADLs and IADLs without significant limits secondary to decreased ROM, decreased strength and/or pain as indicated by Quickdash score of less than 15%.        Start:  03/26/25    Expected End:  06/18/25            Develop and issue HEP to help maximize ROM, strength and tolerance to help maximize return to all pre-onset activities.        Start:  03/26/25    Expected End:  06/18/25            Patient will indicate/ demonstrate an understanding of splint wear, care and precautions.        Start:  03/26/25    Expected End:  06/18/25            The patient will indicate/demonstrate protective and supportive position of R wrist and thumb via splinting to help maximize support and the patients ability to participate in IADLs.        Start:  03/26/25    Expected End:  06/18/25            Patient will demonstrate a progressive increase in ROM as appropriate with R wrist and thumb to be within 5-10 degrees of L to help patient resume normal ADL and IADL function.        Start:  03/26/25    Expected End:  06/18/25               OT Goals       Pain to be less than or equal to 1/10 with greater than or equal to 45 minutes activity.        Start:  03/26/25    Expected End:  06/18/25                    The supervising therapist was present for the entire session and made all clinical decisions.

## 2025-04-16 ENCOUNTER — TREATMENT (OUTPATIENT)
Dept: OCCUPATIONAL THERAPY | Facility: CLINIC | Age: 45
End: 2025-04-16
Payer: COMMERCIAL

## 2025-04-16 DIAGNOSIS — M79.644 PAIN OF RIGHT THUMB: ICD-10-CM

## 2025-04-16 DIAGNOSIS — M25.60 STIFFNESS IN JOINT: Primary | ICD-10-CM

## 2025-04-16 DIAGNOSIS — R53.1 WEAKNESS: ICD-10-CM

## 2025-04-16 PROCEDURE — 97530 THERAPEUTIC ACTIVITIES: CPT | Mod: GO | Performed by: OCCUPATIONAL THERAPIST

## 2025-04-16 PROCEDURE — 97110 THERAPEUTIC EXERCISES: CPT | Mod: GO | Performed by: OCCUPATIONAL THERAPIST

## 2025-04-16 PROCEDURE — 97140 MANUAL THERAPY 1/> REGIONS: CPT | Mod: GO | Performed by: OCCUPATIONAL THERAPIST

## 2025-04-16 ASSESSMENT — PAIN SCALES - GENERAL: PAINLEVEL_OUTOF10: 7

## 2025-04-16 NOTE — PROGRESS NOTES
Occupational Therapy    OT Treatment    Patient Name: Cele George  MRN: [id number]  Today's Date: 4/16/2025     Time Calculation  Start Time: 1215  Stop Time: 1300  Time Calculation (min): 45 min    Visit Number: 4  Therapeutic Procedures:      OT Therapeutic Procedures Time Entry  Manual Therapy Time Entry: 15  Therapeutic Activity Time Entry: 15  Therapeutic Exercise Time Entry: 15                         Current Problem:  1. Stiffness in joint        2. Pain of right thumb  Follow Up In Occupational Therapy      3. Weakness            Subjective     General:  OT Received On: 04/16/25  Client mentioned her infection at incision site has been steadily improving and has stopped applying ointment as of 2 days ago. Client said she has started trying to use a pencil with affected hand which has been difficult.      Pain:  Pain Assessment  0-10 (Numeric) Pain Score: 7  Beginning of session: 4/10  End of session: 7/10    Objective       Therapy/Activity:   DOS: 3/11/2025 Post-op: 2 weeks Post-op: 3 weeks  Post-op: 4 weeks Post-op: 5 weeks    3/26/2025 4/3/2025 4/9/2025 4/16/2025   Exercises: Reps:      Wrist AROM  Wrist AAROM  Flex/ext 1x5  RD/UD 1x5  Sup/pron 1x5 AROM  1x10  1x10  1x10    AAROM  1x5  1x5  1x5 AROM  1x5  1x5  1x5    AAROM  1x5  1x5  1x5   IP joint blocking AROM  1x20 1x15 1x5   Digit opposition    1x10 1x10   MP joint blocking AROM   1x10 1x10    IP joint blocking AAROM    1x10   MP joint blocking AAROM    1x5 assisted by therapist  1x10    Activities:       Sponge squeezing     1x5 composite full flexion                        Modalities:              Orthotic Splint fabrication - see section above.       Compression sleeves    Given for R hand d/t swelling at dorsal aspect of base of thumb. Additionally given compression sleeve for L hand to aid in supporting hand d/t increased arthritis with more use.          Manual:       STM   STM given to volar aspect of wrist and forearm avoiding the thumb  incision based upon weeping STM given to volar aspect of wrist and forearm avoiding the thumb incision based upon weeping x15 min STM given to volar aspect of wrist and forearm avoiding the thumb incision based upon weeping x15 min                        Functional review:        Completed on: 3/26/2025 OT evaluation        Thumb Measurements:   MP IP   Right +10/30 0/60        OP EDUCATION:  Education  Individual(s) Educated: Patient  Education Provided: Diagnosis & Precautions, Symptom management, Anatomy & Physiology  Home Program: AAROM, AROM    Assessment:   Pt participated in therapeutic exercises and activities as needed to improve R thumb/wrist ROM. Client tolerated all exercises well in session with little change in pain status. Client did have soreness by end of session based upon challenge presented with exercises. Client given recommendations to add AAROM thumb blocking exercises to HEP along with sponge exercises and to monitor pain as needed. Client was given compression sleeve for R hand to manage edema along with L hand d/t arthritic pain from overuse. Client was able to tolerate STM as healed infection incision site well with no increases in pain. Measurements were taken demonstrating increased flexion with IP joint from today session (4/16/2025) compared to previous OT session on 4/2/2025 along with increased MP join flexion taken on 4/9/2025.     Plan:   Pt to continue addressing R thumb/wrist ROM as needed to increase ADL/IADL task completion with improved efficiency and goal progress.      Goals:  Active       OT Goals       LTG - Patient will indicate/ demonstrate the ability to resume all preinjury ADLs and IADLs without significant limits secondary to decreased ROM, decreased strength and/or pain as indicated by Quickdash score of less than 15%.        Start:  03/26/25    Expected End:  06/18/25            Develop and issue HEP to help maximize ROM, strength and tolerance to help maximize  return to all pre-onset activities.        Start:  03/26/25    Expected End:  06/18/25            Patient will indicate/ demonstrate an understanding of splint wear, care and precautions.        Start:  03/26/25    Expected End:  06/18/25            The patient will indicate/demonstrate protective and supportive position of R wrist and thumb via splinting to help maximize support and the patients ability to participate in IADLs.        Start:  03/26/25    Expected End:  06/18/25            Patient will demonstrate a progressive increase in ROM as appropriate with R wrist and thumb to be within 5-10 degrees of L to help patient resume normal ADL and IADL function.        Start:  03/26/25    Expected End:  06/18/25               OT Goals       Pain to be less than or equal to 1/10 with greater than or equal to 45 minutes activity.        Start:  03/26/25    Expected End:  06/18/25                    The supervising therapist was present for the entire session and made all clinical decisions.

## 2025-04-23 ENCOUNTER — APPOINTMENT (OUTPATIENT)
Dept: ORTHOPEDIC SURGERY | Facility: CLINIC | Age: 45
End: 2025-04-23
Payer: COMMERCIAL

## 2025-04-23 ENCOUNTER — TREATMENT (OUTPATIENT)
Dept: OCCUPATIONAL THERAPY | Facility: CLINIC | Age: 45
End: 2025-04-23
Payer: COMMERCIAL

## 2025-04-23 VITALS — HEIGHT: 60 IN | WEIGHT: 133 LBS | BODY MASS INDEX: 26.11 KG/M2

## 2025-04-23 DIAGNOSIS — M25.60 STIFFNESS IN JOINT: Primary | ICD-10-CM

## 2025-04-23 DIAGNOSIS — M18.0 OSTEOARTHRITIS OF CARPOMETACARPAL (CMC) JOINT OF BOTH THUMBS: Primary | ICD-10-CM

## 2025-04-23 DIAGNOSIS — M18.0 OSTEOARTHRITIS OF CARPOMETACARPAL (CMC) JOINT OF BOTH THUMBS: ICD-10-CM

## 2025-04-23 DIAGNOSIS — M79.644 PAIN OF RIGHT THUMB: ICD-10-CM

## 2025-04-23 PROCEDURE — 1036F TOBACCO NON-USER: CPT | Performed by: ORTHOPAEDIC SURGERY

## 2025-04-23 PROCEDURE — 3008F BODY MASS INDEX DOCD: CPT | Performed by: ORTHOPAEDIC SURGERY

## 2025-04-23 PROCEDURE — 99024 POSTOP FOLLOW-UP VISIT: CPT | Performed by: ORTHOPAEDIC SURGERY

## 2025-04-23 ASSESSMENT — PAIN - FUNCTIONAL ASSESSMENT: PAIN_FUNCTIONAL_ASSESSMENT: NO/DENIES PAIN

## 2025-04-23 NOTE — PROGRESS NOTES
44 y.o. female presents today for for follow up after right thumb CMC arthroplasty. The patient reports doing well, symptoms improving. The DOS was 6 weeks ago. Pain is controlled. Patient denies numbness and tingling except incisional.  Splint has been worn as directed.   Does have some pain over the first dorsal compartment radial styloid.    Review of Systems    Constitutional: see HPI, no fever, no chills, not feeling tired, no significant weight gain or weight loss.   Eyes: No vision changes  ENT: no nosebleeds.   Cardiovascular: no chest pain.   Respiratory: no shortness of breath and no cough.   Gastrointestinal: no abdominal pain, no nausea, no vomiting and no diarrhea.   Musculoskeletal: per HPI  Neurological: no headache, no gait disturbances  Psychiatric: no depression and no sleep disturbances.   Endocrine: no muscle weakness and no muscle cramps.   Hematologic/Lymphatic: no swollen glands and no tendency for easy bruising or excessive swelling.     Patient's past medical history, past surgical history, allergies, and medications have been reviewed unless otherwise noted in the chart.     Hand/Wrist Post-Op Exam  Inspection:  no evidence of infection, no erythema, Palpation:  compartments are soft, Range of Motion:  F/E 80/80, S/P 90/90 Thumb ROM Full extension, full flexion Stability:  stable Strength:  5/5 F/E, 5/5 Adduction/Abduction Skin:  incision site clean, dry and intact, healing without complication, Vascular:  capillary refill <2 seconds distally, Sensation:  intact to light touch distally.    Constitutional   General appearance: Alert and in no acute distress. Well developed, well nourished.    Eyes   External Eye, Conjunctiva and lids: Normal external exam - pupils were equal in size, round, reactive to light (PERRL) with normal accommodation and extraocular movements intact (EOMI).   Ears, Nose, Mouth, and Throat   Hearing: Normal.   Neck   Neck: No neck mass was observed. Supple.    Pulmonary   Respiratory effort: No respiratory distress.   Cardiovascular   Examination of extremities: No peripheral edema.   Psychiatric   Judgment and insight: Intact.   Orientation to person, place, and time: Alert and oriented x 3.       Mood and affect: Normal.      6 weeks status post right thumb CMC arthroplasty  Based on the history, physical exam and imaging studies above, the patient's presentation is consistent with the above diagnosis.  I had a long discussion with the patient regarding their presentation and the treatment options.    We again discussed her treatment options going forward along with their associated risks and benefits. After thorough discussion, the patient has elected to proceed with postoperative care. All questions were answered to the patients satisfaction who seems satisfied with the plan.  They will call the office with any questions/concerns.     Vitamin E cream prn to scar tissue  Occupational Therapy evaluation and treatment, hand-based thumb spica full-time x 6 weeks  Follow-up 6 weeks no x-ray

## 2025-04-23 NOTE — PROGRESS NOTES
Occupational Therapy    OT Treatment    Patient Name: Cele George  MRN: [id number]  Today's Date: 4/23/2025     Time Calculation  Start Time: 0915  Stop Time: 1000  Time Calculation (min): 45 min    Visit Number: 5  Therapeutic Procedures:      OT Therapeutic Procedures Time Entry  Manual Therapy Time Entry: 10  Therapeutic Activity Time Entry: 15  Therapeutic Exercise Time Entry: 20                         Current Problem:  1. Stiffness in joint        2. Pain of right thumb  Follow Up In Occupational Therapy      3. Osteoarthritis of carpometacarpal (CMC) joint of both thumbs            Subjective     General:  OT Received On: 04/23/25    Client reported being able to handle a pencil better within her R hand with a better grasp and additionally reports her incision sites are healing well.           Objective       Therapy/Activity:   DOS: 3/11/2025 Post-op: 2 weeks Post-op: 3 weeks  Post-op: 4 weeks Post-op: 5 weeks Post-op: 6 weeks    3/26/2025 4/3/2025 4/9/2025 4/16/2025 4/23/2025   Exercises: Reps:       Wrist AROM  Wrist AAROM  Flex/ext 1x5  RD/UD 1x5  Sup/pron 1x5 AROM  1x10  1x10  1x10    AAROM  1x5  1x5  1x5 AROM  1x5  1x5  1x5    AAROM  1x5  1x5  1x5 AROM   Flex/ext 1x10  RD/UD 1x10  Sup/pron 1x10    AAROM  Flex/ext 1x10  RD/UD 1x10  Sup/pron 1x10   IP joint blocking AROM  1x20 1x15 1x5    Digit opposition    1x10 1x10    MP joint blocking AROM   1x10 1x10     IP joint blocking AAROM    1x10 1x15   MP joint blocking AAROM    1x5 assisted by therapist  1x10  1x10 assisted by therapist   Activities:        Sponge squeezing     1x5 composite full flexion 1x10 composite full flexion  1x10 alt 1 pt pinch                            Modalities:                Orthotic Splint fabrication - see section above.        Compression sleeves    Given for R hand d/t swelling at dorsal aspect of base of thumb. Additionally given compression sleeve for L hand to aid in supporting hand d/t increased arthritis with  more use.            Manual:        STM   STM given to volar aspect of wrist and forearm avoiding the thumb incision based upon weeping STM given to volar aspect of wrist and forearm avoiding the thumb incision based upon weeping x15 min STM given to volar aspect of wrist and forearm avoiding the thumb incision based upon weeping x15 min STM given to volar aspect of wrist and forearm along with incision site at dorsal aspect of base of thumb x10 min                           Functional review:         Completed on: 3/26/2025 OT evaluation           OP EDUCATION:  Education  Individual(s) Educated: Patient  Education Provided: Diagnosis & Precautions, Anatomy & Physiology, Symptom management  Home Program: AAROM, AROM    Assessment:   Pt participated in therapeutic exercises and activities as needed to improve R thumb and wrist ROM to improve functional usage. Client tolerated all exercises well with no increases with pain throughout exercises. Client had some residual pain with alt 1 pt pinch with sponge. Additionally, client was able to tolerate STM with no muscle guarding present at forearm and only soreness present at base of thumb dorsal side. Client demonstrated overall good ROM with the thumb thus far.       Plan:   Pt to continue addressing R thumb and wrist ROM for continued functional usage and goal progression.      Goals:  Active       OT Goals       LTG - Patient will indicate/ demonstrate the ability to resume all preinjury ADLs and IADLs without significant limits secondary to decreased ROM, decreased strength and/or pain as indicated by Quickdash score of less than 15%.        Start:  03/26/25    Expected End:  06/18/25            Develop and issue HEP to help maximize ROM, strength and tolerance to help maximize return to all pre-onset activities.        Start:  03/26/25    Expected End:  06/18/25            Patient will indicate/ demonstrate an understanding of splint wear, care and precautions.         Start:  03/26/25    Expected End:  06/18/25            The patient will indicate/demonstrate protective and supportive position of R wrist and thumb via splinting to help maximize support and the patients ability to participate in IADLs.        Start:  03/26/25    Expected End:  06/18/25            Patient will demonstrate a progressive increase in ROM as appropriate with R wrist and thumb to be within 5-10 degrees of L to help patient resume normal ADL and IADL function.        Start:  03/26/25    Expected End:  06/18/25               OT Goals       Pain to be less than or equal to 1/10 with greater than or equal to 45 minutes activity.        Start:  03/26/25    Expected End:  06/18/25                    The supervising therapist was present for the entire session and made all clinical decisions.

## 2025-04-23 NOTE — PROGRESS NOTES
6 weeks postop s/p right CMC arthroplasty done 3/11/2025. Pain is well controlled with the current treatment plan. Reports numbness/tingling from the base of the thumb to the proximal end of the 1st MCP. [facility] thumb splint. Overall, they are doing good. They have no concerns.

## 2025-04-30 ENCOUNTER — TREATMENT (OUTPATIENT)
Dept: OCCUPATIONAL THERAPY | Facility: CLINIC | Age: 45
End: 2025-04-30
Payer: COMMERCIAL

## 2025-04-30 DIAGNOSIS — M25.60 STIFFNESS IN JOINT: Primary | ICD-10-CM

## 2025-04-30 DIAGNOSIS — M79.644 PAIN OF RIGHT THUMB: ICD-10-CM

## 2025-04-30 DIAGNOSIS — R53.1 WEAKNESS: ICD-10-CM

## 2025-04-30 PROCEDURE — 97140 MANUAL THERAPY 1/> REGIONS: CPT | Mod: GO | Performed by: OCCUPATIONAL THERAPIST

## 2025-04-30 PROCEDURE — 97110 THERAPEUTIC EXERCISES: CPT | Mod: GO | Performed by: OCCUPATIONAL THERAPIST

## 2025-04-30 PROCEDURE — 97530 THERAPEUTIC ACTIVITIES: CPT | Mod: GO | Performed by: OCCUPATIONAL THERAPIST

## 2025-04-30 ASSESSMENT — PAIN SCALES - GENERAL: PAINLEVEL_OUTOF10: 3

## 2025-04-30 ASSESSMENT — PAIN - FUNCTIONAL ASSESSMENT: PAIN_FUNCTIONAL_ASSESSMENT: 0-10

## 2025-04-30 NOTE — PROGRESS NOTES
Occupational Therapy    OT Treatment    Patient Name: Cele George  MRN: [id number]  Today's Date: 4/30/2025     Time Calculation  Start Time: 1215  Stop Time: 1259  Time Calculation (min): 44 min    Visit Number: 6  Therapeutic Procedures:      OT Therapeutic Procedures Time Entry  Manual Therapy Time Entry: 15  Therapeutic Activity Time Entry: 15  Therapeutic Exercise Time Entry: 14         Current Problem:  1. Stiffness in joint        2. Pain of right thumb  Follow Up In Occupational Therapy      3. Weakness          Subjective     General:   Pt stating increased pain/discomfort at base of thumb. Pt is wearing hand based thumb spica as instructed, wearing forearm based at night for comfort.      Pain:  Pain Assessment  Pain Assessment: 0-10  0-10 (Numeric) Pain Score: 3    Objective       Therapy/Activity:   DOS: 3/11/2025 Post-op: 5 weeks Post-op: 6 weeks 7 weeks    4/16/2025 4/23/2025 4/30/2025   Exercises:      Wrist AROM  Wrist AAROM AROM  1x5  1x5  1x5    AAROM  1x5  1x5  1x5 AROM   Flex/ext 1x10  RD/UD 1x10  Sup/pron 1x10    AAROM  Flex/ext 1x10  RD/UD 1x10  Sup/pron 1x10 AROM  Flex/ext 1x10  RD/UD 1x10  Sup/pron 1x10    AAROM  Flex/ext 1x5  RD/UD 1x5   IP joint blocking AROM 1x5  1x10   Digit opposition  1x10     MP joint blocking AROM 1x10   1x10   IP joint blocking AAROM 1x10 1x15    MP joint blocking AAROM 1x5 assisted by therapist  1x10  1x10 assisted by therapist    Thumb abduction resting on tennis ball   1x10   Activities:      Sponge squeezing  1x5 composite full flexion 1x10 composite full flexion  1x10 alt 1 pt pinch     Thumb opposition with rosa   2x4 moving rosa from palm of hand to each digit using thumb.    Peg board   1x20 alternating digit tip pinch         Modalities:            Orthotic      Compression sleeves Given for R hand d/t swelling at dorsal aspect of base of thumb. Additionally given compression sleeve for L hand to aid in supporting hand d/t increased arthritis with  more use.           Manual:      STM  STM given to volar aspect of wrist and forearm avoiding the thumb incision based upon weeping x15 min STM given to volar aspect of wrist and forearm along with incision site at dorsal aspect of base of thumb x10 min STM given to volar aspect of thumb and forearm including incisions. PROM completed to IP and MCP during STM. Pt with limited tolerance and MCP. CMC supported throughout PROM. X15 minutes.                      Functional review:       Completed on: 3/26/2025        OP EDUCATION:  Education  Individual(s) Educated: Patient  Education Provided: Diagnosis & Precautions, Anatomy & Physiology, Symptom management  Home Program: AAROM, AROM    Assessment:   Pt participated in therapeutic exercises and activities as needed to progress with thumb and wrist ROM within protocol. Initiation of gentle thumb palmar abduction completed with support of tennis ball. MIN VC for form and pacing. Pt reporting pain at volar aspect of forearm during wrist flex/exercises. PROM completed with CMC supported. Pt reporting mild to moderate discomfort during PROM. Rest breaks provided. Pt tolerated OT treatment, reporting 6/10 pain.     Plan:   Pt to continue addressing ROM and strengthening within protocol.     Goals:  Active       OT Goals       LTG - Patient will indicate/ demonstrate the ability to resume all preinjury ADLs and IADLs without significant limits secondary to decreased ROM, decreased strength and/or pain as indicated by Quickdash score of less than 15%.        Start:  03/26/25    Expected End:  06/18/25            Develop and issue HEP to help maximize ROM, strength and tolerance to help maximize return to all pre-onset activities.        Start:  03/26/25    Expected End:  06/18/25            Patient will indicate/ demonstrate an understanding of splint wear, care and precautions.        Start:  03/26/25    Expected End:  06/18/25            The patient will  indicate/demonstrate protective and supportive position of R wrist and thumb via splinting to help maximize support and the patients ability to participate in IADLs.        Start:  03/26/25    Expected End:  06/18/25            Patient will demonstrate a progressive increase in ROM as appropriate with R wrist and thumb to be within 5-10 degrees of L to help patient resume normal ADL and IADL function.        Start:  03/26/25    Expected End:  06/18/25               OT Goals       Pain to be less than or equal to 1/10 with greater than or equal to 45 minutes activity.        Start:  03/26/25    Expected End:  06/18/25

## 2025-05-07 ENCOUNTER — TREATMENT (OUTPATIENT)
Dept: OCCUPATIONAL THERAPY | Facility: CLINIC | Age: 45
End: 2025-05-07
Payer: COMMERCIAL

## 2025-05-07 DIAGNOSIS — M25.60 STIFFNESS IN JOINT: Primary | ICD-10-CM

## 2025-05-07 DIAGNOSIS — R53.1 WEAKNESS: ICD-10-CM

## 2025-05-07 DIAGNOSIS — M79.644 PAIN OF RIGHT THUMB: ICD-10-CM

## 2025-05-07 PROCEDURE — 97110 THERAPEUTIC EXERCISES: CPT | Mod: GO | Performed by: OCCUPATIONAL THERAPIST

## 2025-05-07 PROCEDURE — 97530 THERAPEUTIC ACTIVITIES: CPT | Mod: GO | Performed by: OCCUPATIONAL THERAPIST

## 2025-05-07 PROCEDURE — 97140 MANUAL THERAPY 1/> REGIONS: CPT | Mod: GO | Performed by: OCCUPATIONAL THERAPIST

## 2025-05-07 ASSESSMENT — PAIN - FUNCTIONAL ASSESSMENT: PAIN_FUNCTIONAL_ASSESSMENT: 0-10

## 2025-05-07 ASSESSMENT — PAIN SCALES - GENERAL: PAINLEVEL_OUTOF10: 4

## 2025-05-07 NOTE — PROGRESS NOTES
Occupational Therapy    OT Treatment    Patient Name: Cele George  MRN: [id number]  Today's Date: 5/7/2025     Time Calculation  Start Time: 1510  Stop Time: 1600  Time Calculation (min): 50 min    Visit Number: 7  Therapeutic Procedures:      OT Therapeutic Procedures Time Entry  Manual Therapy Time Entry: 15  Therapeutic Activity Time Entry: 15  Therapeutic Exercise Time Entry: 20       Current Problem:  1. Stiffness in joint        2. Pain of right thumb  Follow Up In Occupational Therapy      3. Weakness          Subjective     General:   Pt reporting increased pain this past week around R CMC. Pt continues to have numbness and tingling at thumb.      Pain:  Pain Assessment  Pain Assessment: 0-10  0-10 (Numeric) Pain Score: 4    Objective       Therapy/Activity:   DOS: 3/11/2025 Post-op: 6 weeks 7 weeks 8 weeks    4/23/2025 4/30/2025 5/7/2025     Exercises:      MH with digit extension    X8 minutes   Wrist AROM  Wrist AAROM AROM   Flex/ext 1x10  RD/UD 1x10  Sup/pron 1x10    AAROM  Flex/ext 1x10  RD/UD 1x10  Sup/pron 1x10 AROM  Flex/ext 1x10  RD/UD 1x10  Sup/pron 1x10    AAROM  Flex/ext 1x5  RD/UD 1x5 AROM  Flex/ext 1x10  RD/UD 1x10  Sup/pron 1x10    AAROM  Flex/ext 1x5  RD/UD 1x5   IP joint blocking AROM  1x10    Digit opposition       MP joint blocking AROM  1x10    IP joint blocking AAROM 1x15     MP joint blocking AAROM 1x10 assisted by therapist     Thumb abduction resting on tennis ball  1x10    Thumb 5 positions   1x10   Activities:      Sponge squeezing  1x10 composite full flexion  1x10 alt 1 pt pinch      Thumb opposition with rosa  2x4 moving rosa from palm of hand to each digit using thumb.  1x5 moving rosa from palm of hand to each digit using thumb.    Peg board  1x20 alternating digit tip pinch    Yellow T-putty   1x10 composite flexion, alternating pinch,    Modalities:            Orthotic      Compression sleeves            Manual:      STM  STM given to volar aspect of wrist and  forearm along with incision site at dorsal aspect of base of thumb x10 min STM given to volar aspect of thumb and forearm including incisions. PROM completed to IP and MCP during STM. Pt with limited tolerance and MCP. CMC supported throughout PROM. X15 minutes.  STM to volar aspect of thumb and forearm and gentle PROM to thumb IP and MCP. X15 minutes                     Functional review:       Completed on: 5/7/2025        Measurements:  :  Average taken from best 3 measurements.   Trials Average   RUE 9, 9, 14 10   LUE 26, 36, 35 32   RUE 31% of LUE    OP EDUCATION:  Education  Individual(s) Educated: Patient  Education Provided: Diagnosis & Precautions, Anatomy & Physiology, Symptom management  Home Program: AAROM, AROM    Assessment:   Pt participated in therapeutic exercises and activities as needed to progress with thumb ROM and gentle strengthening per protocol.  strength measurements complete, goals updated. Initiation of gentle theraputty exercises for strengthening. Pt instructed to complete within pain free range. STM completed for scar management and pain relief. Pt reporting 2/10 pain after OT session.    Plan:   Pt to continue addressing ROM and gentle strengthening.      Goals:  Active       OT Goals       LTG - Patient will indicate/ demonstrate the ability to resume all preinjury ADLs and IADLs without significant limits secondary to decreased ROM, decreased strength and/or pain as indicated by Quickdash score of less than 15%.        Start:  03/26/25    Expected End:  06/18/25            Develop and issue HEP to help maximize ROM, strength and tolerance to help maximize return to all pre-onset activities.        Start:  03/26/25    Expected End:  06/18/25            Patient will indicate/ demonstrate an understanding of splint wear, care and precautions.        Start:  03/26/25    Expected End:  06/18/25            The patient will indicate/demonstrate protective and supportive  position of R wrist and thumb via splinting to help maximize support and the patients ability to participate in IADLs.        Start:  03/26/25    Expected End:  06/18/25            Patient will demonstrate a progressive increase in ROM as appropriate with R wrist and thumb to be within 5-10 degrees of L to help patient resume normal ADL and IADL function.        Start:  03/26/25    Expected End:  06/18/25               OT Goals       Pain to be less than or equal to 1/10 with greater than or equal to 45 minutes activity.        Start:  03/26/25    Expected End:  06/18/25               OT Goals       Pt will demonstrate increased  strength as appropriate with the R  to be greater than or equal to 70% of the L to help patient resume ADLs and IADLs.        Start:  05/07/25    Expected End:  06/18/25

## 2025-05-14 ENCOUNTER — TREATMENT (OUTPATIENT)
Dept: OCCUPATIONAL THERAPY | Facility: CLINIC | Age: 45
End: 2025-05-14
Payer: COMMERCIAL

## 2025-05-14 DIAGNOSIS — R53.1 WEAKNESS: ICD-10-CM

## 2025-05-14 DIAGNOSIS — M25.60 STIFFNESS IN JOINT: Primary | ICD-10-CM

## 2025-05-14 DIAGNOSIS — M79.644 PAIN OF RIGHT THUMB: ICD-10-CM

## 2025-05-14 PROCEDURE — 97110 THERAPEUTIC EXERCISES: CPT | Mod: GO | Performed by: OCCUPATIONAL THERAPIST

## 2025-05-14 PROCEDURE — 97530 THERAPEUTIC ACTIVITIES: CPT | Mod: GO | Performed by: OCCUPATIONAL THERAPIST

## 2025-05-14 PROCEDURE — 97140 MANUAL THERAPY 1/> REGIONS: CPT | Mod: GO | Performed by: OCCUPATIONAL THERAPIST

## 2025-05-14 ASSESSMENT — PAIN - FUNCTIONAL ASSESSMENT: PAIN_FUNCTIONAL_ASSESSMENT: 0-10

## 2025-05-14 ASSESSMENT — PAIN SCALES - GENERAL: PAINLEVEL_OUTOF10: 0 - NO PAIN

## 2025-05-14 NOTE — PROGRESS NOTES
Occupational Therapy    OT Treatment    Patient Name: Cele George  MRN: [id number]  Today's Date: 5/14/2025     Time Calculation  Start Time: 1600  Stop Time: 1645  Time Calculation (min): 45 min    Visit Number: 8  Therapeutic Procedures:      OT Therapeutic Procedures Time Entry  Manual Therapy Time Entry: 15  Therapeutic Activity Time Entry: 15  Therapeutic Exercise Time Entry: 15         Current Problem:  1. Stiffness in joint        2. Pain of right thumb  Follow Up In Occupational Therapy      3. Weakness          Subjective     General:   Pt reporting exercises are going good. Stating some forceful exercises are mildly painful. Pt stating she used her hand today to do her hair. Reporting sensation felt weird but it was not overly painful.     Pain:  Pain Assessment  Pain Assessment: 0-10  0-10 (Numeric) Pain Score: 0 - No pain    Objective       Therapy/Activity:   DOS: 3/11/2025 Post-op: 6 weeks 7 weeks 8 weeks 9 weeks    4/23/2025 4/30/2025 5/7/2025 5/14/2025   Exercises:       MH with digit extension    X8 minutes    Wrist AROM  Wrist AAROM AROM   Flex/ext 1x10  RD/UD 1x10  Sup/pron 1x10    AAROM  Flex/ext 1x10  RD/UD 1x10  Sup/pron 1x10 AROM  Flex/ext 1x10  RD/UD 1x10  Sup/pron 1x10    AAROM  Flex/ext 1x5  RD/UD 1x5 AROM  Flex/ext 1x10  RD/UD 1x10  Sup/pron 1x10    AAROM  Flex/ext 1x5  RD/UD 1x5 AROM  Flex/ext 1x10  RD/UD 1x10     IP joint blocking AROM  1x10     Digit opposition        MP joint blocking AROM  1x10  1x10   IP joint blocking AAROM 1x15   1x10   MP joint blocking AAROM 1x10 assisted by therapist      Thumb abduction resting on tennis ball  1x10     Thumb 5 positions   1x10 1x10   Activities:       Sponge squeezing  1x10 composite full flexion  1x10 alt 1 pt pinch       Thumb opposition with rosa  2x4 moving rosa from palm of hand to each digit using thumb.  1x5 moving rosa from palm of hand to each digit using thumb.     Peg board  1x20 alternating digit tip pinch     Velcro  board    1x32 thumb opposition alternating digits for removal/placement.    Hand gripper    10# 2x15   Wrist PREs    2# 1x15 wrist flex/ext  16 oz hammer 1x15 RD/UD, sup/pron   Yellow T-putty   1x10 composite flexion, alternating pinch,     Modalities:              Orthotic       Compression sleeves              Manual:       STM  STM given to volar aspect of wrist and forearm along with incision site at dorsal aspect of base of thumb x10 min STM given to volar aspect of thumb and forearm including incisions. PROM completed to IP and MCP during STM. Pt with limited tolerance and MCP. CMC supported throughout PROM. X15 minutes.  STM to volar aspect of thumb and forearm and gentle PROM to thumb IP and MCP. X15 minutes STM to volar aspect of thumb and forearm and gentle PROM to thumb IP and MCP. X15 minutes                        Functional review:        Completed on: 5/7/2025    Thumb ROM     Thumb Measurements:   MP IP   Right +20/34 +20/74     OP EDUCATION:  Education  Individual(s) Educated: Patient  Education Provided: Diagnosis & Precautions, Anatomy & Physiology, Symptom management  Home Program: AAROM, AROM, Strengthening    Assessment:   Pt participated in therapeutic exercises and activities as needed to progress with thumb ROM and gentle strengthening within protocol. Initiation of wrist PREs and pinch strengthening within pain free range. Pt reporting discomfort with forearm supination during weighted exercises. Stating wrist flexion is easier than wrist extension. Manual therapy completed to thumb for PROM and scar management.  Mild discomfort during PROM stretches. Pt tolerated OT treatment reporting 3/10 pain.    Plan:   Pt to continue addressing ROM and strengthening following protocol.      Goals:  Active       OT Goals       LTG - Patient will indicate/ demonstrate the ability to resume all preinjury ADLs and IADLs without significant limits secondary to decreased ROM, decreased strength and/or  pain as indicated by Quickdash score of less than 15%.        Start:  03/26/25    Expected End:  06/18/25            Develop and issue HEP to help maximize ROM, strength and tolerance to help maximize return to all pre-onset activities.        Start:  03/26/25    Expected End:  06/18/25            Patient will indicate/ demonstrate an understanding of splint wear, care and precautions.        Start:  03/26/25    Expected End:  06/18/25            The patient will indicate/demonstrate protective and supportive position of R wrist and thumb via splinting to help maximize support and the patients ability to participate in IADLs.        Start:  03/26/25    Expected End:  06/18/25            Patient will demonstrate a progressive increase in ROM as appropriate with R wrist and thumb to be within 5-10 degrees of L to help patient resume normal ADL and IADL function.        Start:  03/26/25    Expected End:  06/18/25               OT Goals       Pain to be less than or equal to 1/10 with greater than or equal to 45 minutes activity.        Start:  03/26/25    Expected End:  06/18/25               OT Goals       Pt will demonstrate increased  strength as appropriate with the R  to be greater than or equal to 70% of the L to help patient resume ADLs and IADLs.        Start:  05/07/25    Expected End:  06/18/25

## 2025-05-21 ENCOUNTER — TREATMENT (OUTPATIENT)
Dept: OCCUPATIONAL THERAPY | Facility: CLINIC | Age: 45
End: 2025-05-21
Payer: COMMERCIAL

## 2025-05-21 DIAGNOSIS — M25.60 STIFFNESS IN JOINT: Primary | ICD-10-CM

## 2025-05-21 DIAGNOSIS — R53.1 WEAKNESS: ICD-10-CM

## 2025-05-21 DIAGNOSIS — M79.644 PAIN OF RIGHT THUMB: ICD-10-CM

## 2025-05-21 PROCEDURE — 97110 THERAPEUTIC EXERCISES: CPT | Mod: GO | Performed by: OCCUPATIONAL THERAPIST

## 2025-05-21 PROCEDURE — 97140 MANUAL THERAPY 1/> REGIONS: CPT | Mod: GO | Performed by: OCCUPATIONAL THERAPIST

## 2025-05-21 ASSESSMENT — PAIN - FUNCTIONAL ASSESSMENT: PAIN_FUNCTIONAL_ASSESSMENT: 0-10

## 2025-05-21 ASSESSMENT — PAIN SCALES - GENERAL: PAINLEVEL_OUTOF10: 1

## 2025-05-21 NOTE — PROGRESS NOTES
Occupational Therapy    OT Treatment    Patient Name: Cele George  MRN: [id number]  Today's Date: 5/21/2025     Time Calculation  Start Time: 1600  Stop Time: 1650  Time Calculation (min): 50 min    Visit Number: 9  Therapeutic Procedures:      OT Therapeutic Procedures Time Entry  Manual Therapy Time Entry: 15  Therapeutic Exercise Time Entry: 35       Current Problem:  1. Stiffness in joint        2. Pain of right thumb  Follow Up In Occupational Therapy      3. Weakness          Subjective     General:   Pt stating she still has pain to touch and numbness/tingling at thumb. Pt stating some functional tasks cause pain to wrist, for example putting coat.     Pain:  Pain Assessment  Pain Assessment: 0-10  0-10 (Numeric) Pain Score: 1    Objective       Therapy/Activity:   DOS: 3/11/2025 7 weeks 8 weeks 9 weeks 10 weeks    4/30/2025 5/7/2025 5/14/2025 5/21/2025     Exercises:       MH with digit extension   X8 minutes  X8 minutes with thumb extension   Wrist AROM  Wrist AAROM AROM  Flex/ext 1x10  RD/UD 1x10  Sup/pron 1x10    AAROM  Flex/ext 1x5  RD/UD 1x5 AROM  Flex/ext 1x10  RD/UD 1x10  Sup/pron 1x10    AAROM  Flex/ext 1x5  RD/UD 1x5 AROM  Flex/ext 1x10  RD/UD 1x10   AROM  Flex/ext 1x10  RD/UD 1x10   IP joint blocking AROM 1x10      Digit opposition        MP joint blocking AROM 1x10  1x10 1x10   IP joint blocking AAROM   1x10 1x10   MP joint blocking AAROM       Thumb abduction resting on tennis ball 1x10      Thumb 5 positions  1x10 1x10 1x10   Activities:       Sponge squeezing        Thumb opposition with rosa 2x4 moving rosa from palm of hand to each digit using thumb.  1x5 moving rosa from palm of hand to each digit using thumb.      Peg board 1x20 alternating digit tip pinch      Velcro board   1x32 thumb opposition alternating digits for removal/placement.     Hand gripper   10# 2x15 10# 2x10   Wrist roll-ups    1# wrist flex/ext   Wrist PREs   2# 1x15 wrist flex/ext  16 oz hammer 1x15 RD/UD,  sup/pron 2# 2x10 wrist flex/ext  16 oz hammer   2x10 RD/UD, sup/pron     Yellow T-putty  1x10 composite flexion, alternating pinch,      WB wrist extension    1x5 with 30 second hold   Modalities:              Orthotic       Compression sleeves              Manual:       STM  STM given to volar aspect of thumb and forearm including incisions. PROM completed to IP and MCP during STM. Pt with limited tolerance and MCP. CMC supported throughout PROM. X15 minutes.  STM to volar aspect of thumb and forearm and gentle PROM to thumb IP and MCP. X15 minutes STM to volar aspect of thumb and forearm and gentle PROM to thumb IP and MCP. X15 minutes STM to volar aspect of thumb and forearm and gentle PROM to thumb IP and MCP. X15 minutes                        Functional review:        Completed on: 5/7/2025   Thumb ROM      OP EDUCATION:  Education  Individual(s) Educated: Patient  Education Provided: Diagnosis & Precautions, Anatomy & Physiology, Symptom management  Home Program: AAROM, AROM, Strengthening    Assessment:   Pt participated in therapeutic exercises and activities as needed to progress with performance deficits. Strengthening and WB initiated this date. Pt with increase in pain during tasks, however demonstrates ability to complete repetitions. Pt tolerated OT treatment.      Plan:   Pt to continue addressing ROM and strengthening as tolerated.      Goals:  Active       OT Goals       LTG - Patient will indicate/ demonstrate the ability to resume all preinjury ADLs and IADLs without significant limits secondary to decreased ROM, decreased strength and/or pain as indicated by Quickdash score of less than 15%.        Start:  03/26/25    Expected End:  06/18/25            Develop and issue HEP to help maximize ROM, strength and tolerance to help maximize return to all pre-onset activities.        Start:  03/26/25    Expected End:  06/18/25            Patient will indicate/ demonstrate an understanding of splint  wear, care and precautions.        Start:  03/26/25    Expected End:  06/18/25            The patient will indicate/demonstrate protective and supportive position of R wrist and thumb via splinting to help maximize support and the patients ability to participate in IADLs.        Start:  03/26/25    Expected End:  06/18/25            Patient will demonstrate a progressive increase in ROM as appropriate with R wrist and thumb to be within 5-10 degrees of L to help patient resume normal ADL and IADL function.        Start:  03/26/25    Expected End:  06/18/25               OT Goals       Pain to be less than or equal to 1/10 with greater than or equal to 45 minutes activity.        Start:  03/26/25    Expected End:  06/18/25               OT Goals       Pt will demonstrate increased  strength as appropriate with the R  to be greater than or equal to 70% of the L to help patient resume ADLs and IADLs.        Start:  05/07/25    Expected End:  06/18/25

## 2025-05-28 ENCOUNTER — APPOINTMENT (OUTPATIENT)
Dept: OCCUPATIONAL THERAPY | Facility: CLINIC | Age: 45
End: 2025-05-28
Payer: COMMERCIAL

## 2025-05-28 ENCOUNTER — DOCUMENTATION (OUTPATIENT)
Dept: OCCUPATIONAL THERAPY | Facility: CLINIC | Age: 45
End: 2025-05-28

## 2025-05-28 ENCOUNTER — APPOINTMENT (OUTPATIENT)
Dept: PRIMARY CARE | Facility: CLINIC | Age: 45
End: 2025-05-28
Payer: COMMERCIAL

## 2025-05-28 DIAGNOSIS — M79.644 PAIN OF RIGHT THUMB: ICD-10-CM

## 2025-05-28 DIAGNOSIS — R53.1 WEAKNESS: ICD-10-CM

## 2025-05-28 DIAGNOSIS — D12.6 ADENOMA OF COLON: ICD-10-CM

## 2025-05-28 DIAGNOSIS — F51.04 CHRONIC INSOMNIA: ICD-10-CM

## 2025-05-28 DIAGNOSIS — M25.60 STIFFNESS IN JOINT: Primary | ICD-10-CM

## 2025-05-28 DIAGNOSIS — F32.A DEPRESSION, UNSPECIFIED DEPRESSION TYPE: Primary | ICD-10-CM

## 2025-05-28 DIAGNOSIS — F41.9 ANXIETY: ICD-10-CM

## 2025-05-28 PROCEDURE — 1036F TOBACCO NON-USER: CPT | Performed by: NURSE PRACTITIONER

## 2025-05-28 PROCEDURE — 99213 OFFICE O/P EST LOW 20 MIN: CPT | Performed by: NURSE PRACTITIONER

## 2025-05-28 RX ORDER — BUSPIRONE HYDROCHLORIDE 5 MG/1
5 TABLET ORAL 3 TIMES DAILY
Qty: 270 TABLET | Refills: 1 | Status: SHIPPED | OUTPATIENT
Start: 2025-05-28

## 2025-05-28 RX ORDER — TRAZODONE HYDROCHLORIDE 50 MG/1
50 TABLET ORAL NIGHTLY PRN
Qty: 90 TABLET | Refills: 1 | Status: SHIPPED | OUTPATIENT
Start: 2025-05-28

## 2025-05-28 ASSESSMENT — ENCOUNTER SYMPTOMS
SLEEP DISTURBANCE: 1
CHILLS: 0
NERVOUS/ANXIOUS: 1
FEVER: 0
FATIGUE: 0
DYSPHORIC MOOD: 1

## 2025-05-28 ASSESSMENT — PATIENT HEALTH QUESTIONNAIRE - PHQ9
SUM OF ALL RESPONSES TO PHQ9 QUESTIONS 1 AND 2: 0
1. LITTLE INTEREST OR PLEASURE IN DOING THINGS: NOT AT ALL
2. FEELING DOWN, DEPRESSED OR HOPELESS: NOT AT ALL

## 2025-05-28 NOTE — ASSESSMENT & PLAN NOTE
Slightly increased lately with recent situational events.  She will plan to take the Buspirone more regularly.  Otherwise is doing pretty well on current dose of medications.  She was provided referral to psychology and given [facility] information.

## 2025-05-28 NOTE — PROGRESS NOTES
Subjective   Cele George is a 44 y.o. female who presents for 6 mon fu on virtual (Anxiety).    Virtual or Telephone Consent    An interactive audio and video telecommunication system which permits real time communications between the patient (at the originating site) and provider (at the distant site) was utilized to provide this telehealth service.   Verbal consent was requested and obtained from Cele George on this date, 05/28/25 for a telehealth visit and the patient's location was confirmed at the time of the visit. She was located in the [facility] for the visit today.    HPI  She presents today virtually for a follow up on anxiety and depression.  Since last visit had right hand surgery. Overall is healing well but reports it has been a long recovery.  She reports she has had a lot of stress lately. Reports she is in the process of getting a divorce which has been stressful.  She is taking the medications as prescribed. Taking daily.  She generally only takes the Buspirone once a day.   She is taking Trazodone nightly to help with sleep.  No side effects.  (+) feeling sad, down, helpless or hopeless (situational)  Emotional with everything going on.  No SI or HI.  (+) excessive worry.  No worry about worst case scenarios. She reports she is a planner though- plans for the best and the worst.  (+) some panic attacks lately.  She has noticed more GI upset since anxiety has been heightened.  Sleeping is not great at night but better with the Trazodone  Diet: Healthy  Exercise: Started running- about 2 miles 2-3 days a week  Caffeine use: 1 cup of coffee in the morning  Alcohol use: 1-2 drinks a week.    Review of Systems   Constitutional:  Negative for chills, fatigue and fever.        (+) night sweats over the past year   Psychiatric/Behavioral:  Positive for dysphoric mood and sleep disturbance. Negative for self-injury and suicidal ideas. The patient is nervous/anxious.        Objective    LMP  (LMP Unknown)     Physical Exam  Constitutional:       General: She is not in acute distress.     Appearance: Normal appearance. She is not toxic-appearing.   Pulmonary:      Effort: Pulmonary effort is normal. No respiratory distress.   Neurological:      Mental Status: She is alert and oriented to person, place, and time.   Psychiatric:         Mood and Affect: Mood normal.         Behavior: Behavior normal.         Thought Content: Thought content normal.         Judgment: Judgment normal.         Assessment/Plan   Problem List Items Addressed This Visit       Adenoma of colon    Colonoscopy due in February 2026.         Anxiety    Slightly increased lately with recent situational events.  She will plan to take the Buspirone more regularly.  Otherwise is doing pretty well on current dose of medications.  She was provided referral to psychology and given [employer] information.         Relevant Medications    busPIRone (Buspar) 5 mg tablet    Other Relevant Orders    Referral to Psychology    Chronic insomnia    Overall doing fairly well on trazodone.  Continue.         Relevant Medications    traZODone (Desyrel) 50 mg tablet    Other Relevant Orders    Referral to Psychology    Depression - Primary    Overall well controlled on current medications.  Continue.  Referral placed to psychology for a new therapist.         Relevant Orders    Referral to Psychology     It has been a pleasure seeing you today!

## 2025-05-28 NOTE — ASSESSMENT & PLAN NOTE
Overall well controlled on current medications.  Continue.  Referral placed to psychology for a new therapist.

## 2025-05-28 NOTE — PROGRESS NOTES
Occupational Therapy                 Therapy Communication Note    Patient Name: Cele George  MRN: [id number]  Today's Date: 5/28/2025     Discipline: Occupational Therapy    Missed Visit Reason:  Cancelled via MyChart    Missed Time: Cancel

## 2025-06-04 ENCOUNTER — TREATMENT (OUTPATIENT)
Dept: OCCUPATIONAL THERAPY | Facility: CLINIC | Age: 45
End: 2025-06-04
Payer: COMMERCIAL

## 2025-06-04 ENCOUNTER — APPOINTMENT (OUTPATIENT)
Dept: ORTHOPEDIC SURGERY | Facility: CLINIC | Age: 45
End: 2025-06-04
Payer: COMMERCIAL

## 2025-06-04 VITALS — BODY MASS INDEX: 25.32 KG/M2 | HEIGHT: 60 IN | WEIGHT: 129 LBS

## 2025-06-04 DIAGNOSIS — R53.1 WEAKNESS: ICD-10-CM

## 2025-06-04 DIAGNOSIS — M18.0 OSTEOARTHRITIS OF CARPOMETACARPAL (CMC) JOINT OF BOTH THUMBS: Primary | ICD-10-CM

## 2025-06-04 DIAGNOSIS — M79.644 PAIN OF RIGHT THUMB: ICD-10-CM

## 2025-06-04 DIAGNOSIS — M25.60 STIFFNESS IN JOINT: Primary | ICD-10-CM

## 2025-06-04 PROCEDURE — 97140 MANUAL THERAPY 1/> REGIONS: CPT | Mod: GO | Performed by: OCCUPATIONAL THERAPIST

## 2025-06-04 PROCEDURE — 1036F TOBACCO NON-USER: CPT | Performed by: ORTHOPAEDIC SURGERY

## 2025-06-04 PROCEDURE — 97110 THERAPEUTIC EXERCISES: CPT | Mod: GO | Performed by: OCCUPATIONAL THERAPIST

## 2025-06-04 PROCEDURE — 3008F BODY MASS INDEX DOCD: CPT | Performed by: ORTHOPAEDIC SURGERY

## 2025-06-04 PROCEDURE — 99024 POSTOP FOLLOW-UP VISIT: CPT | Performed by: ORTHOPAEDIC SURGERY

## 2025-06-04 PROCEDURE — 97530 THERAPEUTIC ACTIVITIES: CPT | Mod: GO | Performed by: OCCUPATIONAL THERAPIST

## 2025-06-04 ASSESSMENT — ENCOUNTER SYMPTOMS
LOSS OF SENSATION IN FEET: 0
DEPRESSION: 0
OCCASIONAL FEELINGS OF UNSTEADINESS: 0

## 2025-06-04 ASSESSMENT — PAIN - FUNCTIONAL ASSESSMENT
PAIN_FUNCTIONAL_ASSESSMENT: 0-10
PAIN_FUNCTIONAL_ASSESSMENT: 0-10

## 2025-06-04 ASSESSMENT — PAIN SCALES - GENERAL
PAINLEVEL_OUTOF10: 2
PAINLEVEL_OUTOF10: 0 - NO PAIN

## 2025-06-04 NOTE — PROGRESS NOTES
44 y.o. female presents today for for follow up after right thumb CMC arthroplasty. The patient reports doing well, symptoms improving. The DOS was 12 weeks ago. Pain is controlled. Patient denies numbness and tingling except incisional.  Splint has been worn as directed.      Review of Systems    Constitutional: see HPI, no fever, no chills, not feeling tired, no significant weight gain or weight loss.   Eyes: No vision changes  ENT: no nosebleeds.   Cardiovascular: no chest pain.   Respiratory: no shortness of breath and no cough.   Gastrointestinal: no abdominal pain, no nausea, no vomiting and no diarrhea.   Musculoskeletal: per HPI  Neurological: no headache, no gait disturbances  Psychiatric: no depression and no sleep disturbances.   Endocrine: no muscle weakness and no muscle cramps.   Hematologic/Lymphatic: no swollen glands and no tendency for easy bruising or excessive swelling.     Patient's past medical history, past surgical history, allergies, and medications have been reviewed unless otherwise noted in the chart.     Hand/Wrist Post-Op Exam  Inspection:  no evidence of infection, no erythema, Palpation:  compartments are soft, Range of Motion:  F/E 80/80, S/P 90/90 Thumb ROM Full extension, full flexion opposition to small finger stability:  stable Strength:  5/5 F/E, 5/5 Adduction/Abduction Skin:  incision site clean, dry and intact, healing without complication, Vascular:  capillary refill <2 seconds distally, Sensation:  intact to light touch distally.    Constitutional   General appearance: Alert and in no acute distress. Well developed, well nourished.    Eyes   External Eye, Conjunctiva and lids: Normal external exam - pupils were equal in size, round, reactive to light (PERRL) with normal accommodation and extraocular movements intact (EOMI).   Ears, Nose, Mouth, and Throat   Hearing: Normal.   Neck   Neck: No neck mass was observed. Supple.   Pulmonary   Respiratory effort: No respiratory  distress.   Cardiovascular   Examination of extremities: No peripheral edema.   Psychiatric   Judgment and insight: Intact.   Orientation to person, place, and time: Alert and oriented x 3.       Mood and affect: Normal.      12 weeks status post right thumb CMC arthroplasty  Based on the history, physical exam and imaging studies above, the patient's presentation is consistent with the above diagnosis.  I had a long discussion with the patient regarding their presentation and the treatment options.    We again discussed her treatment options going forward along with their associated risks and benefits. After thorough discussion, the patient has elected to proceed with postoperative care. All questions were answered to the patients satisfaction who seems satisfied with the plan.  They will call the office with any questions/concerns.     Vitamin E cream prn to scar tissue  Occupational Therapy evaluation and treatment, hand-based thumb spica prn  Follow-up 6 weeks prn no x-ray

## 2025-06-04 NOTE — PROGRESS NOTES
Occupational Therapy    OT Treatment    Patient Name: Cele George  MRN: [id number]  Today's Date: 6/4/2025     Time Calculation  Start Time: 0800  Stop Time: 0853  Time Calculation (min): 53 min    Visit Number: 10  Therapeutic Procedures:      OT Therapeutic Procedures Time Entry  Manual Therapy Time Entry: 8  Therapeutic Activity Time Entry: 30  Therapeutic Exercise Time Entry: 15                         Current Problem:  1. Stiffness in joint        2. Pain of right thumb        3. Weakness            Subjective     General:   Pt reporting pain is a little better. Pt continues to wear the brace as instructed. Pt sees Dr. Klein today. Pt stating she was able to snap bra this morning in back for the first time since surgery.      Pain:  Pain Assessment  Pain Assessment: 0-10  0-10 (Numeric) Pain Score: 0 - No pain    Objective       Therapy/Activity:   DOS: 3/11/2025 9 weeks 10 weeks 12 weeks    5/14/2025 5/21/2025 6/4/2025     Exercises:      MH with digit extension   X8 minutes with thumb extension X8 minutes with thumb extension   Wrist AROM  Wrist AAROM AROM  Flex/ext 1x10  RD/UD 1x10   AROM  Flex/ext 1x10  RD/UD 1x10 AROM wrist flex/ext, RD/UD 1x10   IP joint blocking AROM      Digit opposition       MP joint blocking AROM 1x10 1x10 1x10   IP joint blocking AAROM 1x10 1x10 1x10   MP joint blocking AAROM      Thumb abduction resting on tennis ball      Thumb 5 positions 1x10 1x10 1x10   Activities:      Sponge squeezing       Thumb opposition with rosa      Peg board      Velcro board 1x32 thumb opposition alternating digits for removal/placement.      Hand gripper 10# 2x15 10# 2x10 20# 2x10   Wrist roll-ups  1# wrist flex/ext 2# wrist flex/ext 1x10   Wrist PREs 2# 1x15 wrist flex/ext  16 oz hammer 1x15 RD/UD, sup/pron 2# 2x10 wrist flex/ext  16 oz hammer   2x10 RD/UD, sup/pron      Yellow T-putty      WB wrist extension  1x5 with 30 second hold    Red flex bar   1x10 wrist flex/ext    BTE   See  "graph below   Modalities:            Orthotic      Compression sleeves            Manual:      STM  STM to volar aspect of thumb and forearm and gentle PROM to thumb IP and MCP. X15 minutes STM to volar aspect of thumb and forearm and gentle PROM to thumb IP and MCP. X15 minutes       PROM MCP flexion and IP flexion with CMC supported. Pt reporting minimal pain. 1x15 reps               Functional review:       Completed on: 5/7/2025 Thumb ROM                                       Date: 6/4/2025  Date:  Date:             TOOLS: Time/sets Torque Time/sets Torque Time/sets Torque                     162    60 x1 30       151   pinch         502   screw  handle 60 seconds each direction 6 CW, 3 CCW       302   jar smooth         302   jar rough 60 seconds each direction  3       601 60 seconds each direction 9       802 large lever         701 V block/pedal         181  \"X\" 60 seconds - each direction 90/54       136 Steering wheel         122 UE Ergometer                                                                                            OP EDUCATION:  Education  Individual(s) Educated: Patient  Education Provided: Diagnosis & Precautions, Anatomy & Physiology, Symptom management  Home Program: AAROM, AROM, Strengthening    Assessment:   Pt participated in therapeutic exercises and activities as needed to progress with thumb ROM and strengthening tasks. Upgraded POC to include BTE exercises with functional tools. Pt able to tolerate increased resistance with tools. PROM completed at end of session. Increased tolerance to passive stretch. Pt tolerated OT treatment, reporting 2/10 pain.     Plan:   Pt to continue addressing strengthening as tolerated to increase participation in functional tasks.     Goals:  Active       OT Goals       LTG - Patient will indicate/ demonstrate the ability to resume all preinjury ADLs and IADLs without significant limits secondary to decreased ROM, decreased strength " and/or pain as indicated by Quickdash score of less than 15%.        Start:  03/26/25    Expected End:  06/18/25            Develop and issue HEP to help maximize ROM, strength and tolerance to help maximize return to all pre-onset activities.        Start:  03/26/25    Expected End:  06/18/25            Patient will indicate/ demonstrate an understanding of splint wear, care and precautions.        Start:  03/26/25    Expected End:  06/18/25            The patient will indicate/demonstrate protective and supportive position of R wrist and thumb via splinting to help maximize support and the patients ability to participate in IADLs.        Start:  03/26/25    Expected End:  06/18/25            Patient will demonstrate a progressive increase in ROM as appropriate with R wrist and thumb to be within 5-10 degrees of L to help patient resume normal ADL and IADL function.        Start:  03/26/25    Expected End:  06/18/25               OT Goals       Pain to be less than or equal to 1/10 with greater than or equal to 45 minutes activity.        Start:  03/26/25    Expected End:  06/18/25               OT Goals       Pt will demonstrate increased  strength as appropriate with the R  to be greater than or equal to 70% of the L to help patient resume ADLs and IADLs.        Start:  05/07/25    Expected End:  06/18/25

## 2025-06-04 NOTE — PROGRESS NOTES
12 weeks postop s/p right CMC arthroplasty done 3/11/2025. Pain is well controlled with the current treatment plan. They still report numbness/tingling from the base of the thumb to the proximal end of the 1st MCP which is improving. They are still doing occupational therapy at [facility] and are wearing their thumb splint as directed. Overall, they are doing good. They have no concerns.

## 2025-06-11 ENCOUNTER — TREATMENT (OUTPATIENT)
Dept: OCCUPATIONAL THERAPY | Facility: CLINIC | Age: 45
End: 2025-06-11
Payer: COMMERCIAL

## 2025-06-11 DIAGNOSIS — M79.644 PAIN OF RIGHT THUMB: ICD-10-CM

## 2025-06-11 DIAGNOSIS — M25.60 STIFFNESS IN JOINT: Primary | ICD-10-CM

## 2025-06-11 DIAGNOSIS — R53.1 WEAKNESS: ICD-10-CM

## 2025-06-11 PROCEDURE — 97530 THERAPEUTIC ACTIVITIES: CPT | Mod: GO | Performed by: OCCUPATIONAL THERAPIST

## 2025-06-11 PROCEDURE — 97140 MANUAL THERAPY 1/> REGIONS: CPT | Mod: GO | Performed by: OCCUPATIONAL THERAPIST

## 2025-06-11 PROCEDURE — 97110 THERAPEUTIC EXERCISES: CPT | Mod: GO | Performed by: OCCUPATIONAL THERAPIST

## 2025-06-11 ASSESSMENT — PAIN SCALES - GENERAL: PAINLEVEL_OUTOF10: 0 - NO PAIN

## 2025-06-11 ASSESSMENT — PAIN - FUNCTIONAL ASSESSMENT: PAIN_FUNCTIONAL_ASSESSMENT: 0-10

## 2025-06-11 NOTE — PROGRESS NOTES
Occupational Therapy    OT Treatment    Patient Name: Cele George  MRN: [id number]  Today's Date: 6/11/2025     Time Calculation  Start Time: 1605  Stop Time: 1650  Time Calculation (min): 45 min    Visit Number: 11  Therapeutic Procedures:      OT Therapeutic Procedures Time Entry  Therapeutic Activity Time Entry: 20  Therapeutic Exercise Time Entry: 15  Manual Therapy Time Entry: 10       Current Problem:  1. Stiffness in joint        2. Pain of right thumb  Follow Up In Occupational Therapy      3. Weakness          Subjective     General:   Pt reporting thumb is getting better every week. Difficulty with opening containers. Incision is mildly sensitive.      Pain:  Pain Assessment  Pain Assessment: 0-10  0-10 (Numeric) Pain Score: 0 - No pain    Objective       Therapy/Activity:   DOS: 3/11/2025 9 weeks 10 weeks 12 weeks     5/14/2025 5/21/2025 6/4/2025 6/11/2025     Exercises:       MH with digit extension   X8 minutes with thumb extension X8 minutes with thumb extension    Wrist AROM  Wrist AAROM AROM  Flex/ext 1x10  RD/UD 1x10   AROM  Flex/ext 1x10  RD/UD 1x10 AROM wrist flex/ext, RD/UD 1x10 AROM wrist flex/ext, RD/UD 1x10   IP joint blocking AROM       Digit opposition        MP joint blocking AROM 1x10 1x10 1x10    IP joint blocking AAROM 1x10 1x10 1x10    MP joint blocking AAROM       Thumb abduction resting on tennis ball       Thumb 5 positions 1x10 1x10 1x10 1x10   Activities:       Sponge squeezing        Thumb opposition with rosa       Peg board       Velcro board 1x32 thumb opposition alternating digits for removal/placement.       Hand gripper 10# 2x15 10# 2x10 20# 2x10 30# 3x10 various arm positions - elbow flexed, shoulder flexion, shoulder abduction   Wrist roll-ups  1# wrist flex/ext 2# wrist flex/ext 1x10 2# wrist flex/ext 1x5   Wrist PREs 2# 1x15 wrist flex/ext  16 oz hammer 1x15 RD/UD, sup/pron 2# 2x10 wrist flex/ext  16 oz hammer   2x10 RD/UD, sup/pron       Yellow T-putty      "  WB wrist extension  1x5 with 30 second hold     Red flex bar   1x10 wrist flex/ext  1x10 wrist flex/ext   BTE   See graph below See graph below.   Modalities:              Orthotic       Compression sleeves              Manual:       STM  STM to volar aspect of thumb and forearm and gentle PROM to thumb IP and MCP. X15 minutes STM to volar aspect of thumb and forearm and gentle PROM to thumb IP and MCP. X15 minutes        PROM MCP flexion and IP flexion with CMC supported. Pt reporting minimal pain. 1x15 reps PROM MCP flexion and IP flexion with CMC supported. Pt reporting minimal pain. 1x15 reps                 Functional review:        Completed on: 5/7/2025 Thumb ROM                                        Date: 6/4/2025  Date:  6/11/2025  Date:             TOOLS: Time/sets Torque Time/sets Torque Time/sets Torque                     162    60 x1 30 60 x1 45     162   pinch   60 x1 24 for 30 seconds  18 for 30 seconds     502   screw  handle 60 seconds each direction 6 CW, 3 CCW 30 seconds each direction x1 6 CW/CCW     302   jar rough 60 seconds each direction  3 30 seconds each direction x1 6 CW/CCW     601 60 seconds each direction 9 30 seconds each direction x1 9      802 large lever         701 V block/pedal   60 seconds  171     181  \"X\" 60 seconds - each direction 90/54 60 seconds each direction 96/60     141 Steering wheel   60 seconds 30     122 UE Ergometer                    OP EDUCATION:  Education  Individual(s) Educated: Patient  Education Provided: Diagnosis & Precautions, Anatomy & Physiology, Symptom management  Home Program: AAROM, AROM, Strengthening    Assessment:   Pt participated in therapeutic exercises and activities as needed to progress with hand strengthening as needed to increase IND with functional tasks. Pt tolerated BTE exercises, increased resistance added to all tools. Increased ability to tolerate PROM to thumb. Pt reporting 0/10 pain after OT treatment.      Plan:   " Anticipate discharge next visit.      Goals:  Active       OT Goals       LTG - Patient will indicate/ demonstrate the ability to resume all preinjury ADLs and IADLs without significant limits secondary to decreased ROM, decreased strength and/or pain as indicated by Quickdash score of less than 15%.        Start:  03/26/25    Expected End:  06/18/25            Develop and issue HEP to help maximize ROM, strength and tolerance to help maximize return to all pre-onset activities.        Start:  03/26/25    Expected End:  06/18/25            Patient will indicate/ demonstrate an understanding of splint wear, care and precautions.        Start:  03/26/25    Expected End:  06/18/25            The patient will indicate/demonstrate protective and supportive position of R wrist and thumb via splinting to help maximize support and the patients ability to participate in IADLs.        Start:  03/26/25    Expected End:  06/18/25            Patient will demonstrate a progressive increase in ROM as appropriate with R wrist and thumb to be within 5-10 degrees of L to help patient resume normal ADL and IADL function.        Start:  03/26/25    Expected End:  06/18/25               OT Goals       Pain to be less than or equal to 1/10 with greater than or equal to 45 minutes activity.        Start:  03/26/25    Expected End:  06/18/25               OT Goals       Pt will demonstrate increased  strength as appropriate with the R  to be greater than or equal to 70% of the L to help patient resume ADLs and IADLs.        Start:  05/07/25    Expected End:  06/18/25

## 2025-06-18 ENCOUNTER — TREATMENT (OUTPATIENT)
Dept: OCCUPATIONAL THERAPY | Facility: CLINIC | Age: 45
End: 2025-06-18
Payer: COMMERCIAL

## 2025-06-18 DIAGNOSIS — M25.60 STIFFNESS IN JOINT: Primary | ICD-10-CM

## 2025-06-18 DIAGNOSIS — R53.1 WEAKNESS: ICD-10-CM

## 2025-06-18 DIAGNOSIS — M79.644 PAIN OF RIGHT THUMB: ICD-10-CM

## 2025-06-18 PROCEDURE — 97110 THERAPEUTIC EXERCISES: CPT | Mod: GO | Performed by: OCCUPATIONAL THERAPIST

## 2025-06-18 PROCEDURE — 97530 THERAPEUTIC ACTIVITIES: CPT | Mod: GO | Performed by: OCCUPATIONAL THERAPIST

## 2025-06-18 ASSESSMENT — PAIN SCALES - GENERAL: PAINLEVEL_OUTOF10: 0 - NO PAIN

## 2025-06-18 ASSESSMENT — PAIN - FUNCTIONAL ASSESSMENT: PAIN_FUNCTIONAL_ASSESSMENT: 0-10

## 2025-06-18 NOTE — PROGRESS NOTES
Occupational Therapy    OT Discharge    Patient Name: Cele George  MRN: [id number]  Today's Date: 6/18/2025     Time Calculation  Start Time: 1610  Stop Time: 1655  Time Calculation (min): 45 min    Visit Number: 12  Therapeutic Procedures:      OT Therapeutic Procedures Time Entry  Therapeutic Activity Time Entry: 30  Therapeutic Exercise Time Entry: 15             Current Problem:  1. Stiffness in joint        2. Pain of right thumb  Follow Up In Occupational Therapy      3. Weakness          Subjective     General:   Pt stating thumb is doing good, just opening jars/water bottles is painful, has someone else do it for her. Pt reporting she is ready for discharge.     Pain:  Pain Assessment  Pain Assessment: 0-10  0-10 (Numeric) Pain Score: 0 - No pain    Objective       Therapy/Activity:   DOS: 3/11/2025 12 weeks      6/4/2025 6/11/2025 6/18/2025   Exercises:      MH with digit extension  X8 minutes with thumb extension     Wrist AROM  Wrist AAROM AROM wrist flex/ext, RD/UD 1x10 AROM wrist flex/ext, RD/UD 1x10 AROM wrist flex/ext, RD/UD 1x10   IP joint blocking AROM      Digit opposition       MP joint blocking AROM 1x10     IP joint blocking AAROM 1x10     MP joint blocking AAROM      Thumb abduction resting on tennis ball      Thumb 5 positions 1x10 1x10 1x10   Activities:      Sponge squeezing       Thumb opposition with rosa      Peg board      Velcro board      Hand gripper 20# 2x10 30# 3x10 various arm positions - elbow flexed, shoulder flexion, shoulder abduction 30# 3x10 various arm positions - elbow flexed, shoulder flexion, shoulder abduction   Wrist roll-ups 2# wrist flex/ext 1x10 2# wrist flex/ext 1x5 2# wrist flex/ext 1x5   Wrist PREs   4# wrist flex/ext 2x15  16 oz and 1 lb wrist weight RD/UD, sup/pron 2x15   Yellow T-putty      WB wrist extension      Red flex bar 1x10 wrist flex/ext  1x10 wrist flex/ext    BTE See graph below See graph below.    Modalities:            Orthotic       Compression sleeves            Manual:      STM        PROM MCP flexion and IP flexion with CMC supported. Pt reporting minimal pain. 1x15 reps PROM MCP flexion and IP flexion with CMC supported. Pt reporting minimal pain. 1x15 reps                Functional review:       Completed on: 5/7/2025   OT Discharge     Measurements:  :  Average taken from best 3 measurements.   Trials Average   RUE 33, 32, 36 34   LUE 40, 39, 41 40   RUE is 85% of LUE    Thumb Measurements:   MP IP R Abd P Abd   Right +10/32 +20/60 40 35     Wrist Measurements:  WFL unless documented below   Flexion  Extension Radial Dev Ulnar Dev Supination Pronation   Right 60 75 25 60 75 75     Quickdash Scores: 15.91%  Raw score: 18    OP EDUCATION:  Education  Individual(s) Educated: Patient  Education Provided: Diagnosis & Precautions, Anatomy & Physiology, Symptom management  Home Program: DIONI, AROM, Strengthening    Assessment:  Occupational Therapy discharge completed this date. Patient reporting they are able to complete ADL and IADL tasks with increased independence. Pt IND with HEP and demos good understanding. Functional assessments completed showing progress with  and ROM. Pt instructed to call with questions/concerns. Will d/c from OT services with continued HEP.     Plan:   Discharge to IND HEP.      Goals:  Resolved       OT Goals       LTG - Patient will indicate/ demonstrate the ability to resume all preinjury ADLs and IADLs without significant limits secondary to decreased ROM, decreased strength and/or pain as indicated by Quickdash score of less than 15%.  (Met)       Start:  03/26/25    Expected End:  06/18/25    Resolved:  06/18/25         Develop and issue HEP to help maximize ROM, strength and tolerance to help maximize return to all pre-onset activities.  (Met)       Start:  03/26/25    Expected End:  06/18/25    Resolved:  06/18/25         Patient will indicate/ demonstrate an understanding of splint wear, care and  precautions.  (Met)       Start:  03/26/25    Expected End:  06/18/25    Resolved:  06/18/25         The patient will indicate/demonstrate protective and supportive position of R wrist and thumb via splinting to help maximize support and the patients ability to participate in IADLs.  (Met)       Start:  03/26/25    Expected End:  06/18/25    Resolved:  06/18/25         Patient will demonstrate a progressive increase in ROM as appropriate with R wrist and thumb to be within 5-10 degrees of L to help patient resume normal ADL and IADL function.  (Met)       Start:  03/26/25    Expected End:  06/18/25    Resolved:  06/18/25            OT Goals       Pain to be less than or equal to 1/10 with greater than or equal to 45 minutes activity.  (Met)       Start:  03/26/25    Expected End:  06/18/25    Resolved:  06/18/25            OT Goals       Pt will demonstrate increased  strength as appropriate with the R  to be greater than or equal to 70% of the L to help patient resume ADLs and IADLs.  (Met)       Start:  05/07/25    Expected End:  06/18/25    Resolved:  06/18/25

## 2025-07-22 ENCOUNTER — HOSPITAL ENCOUNTER (OUTPATIENT)
Dept: RADIOLOGY | Facility: HOSPITAL | Age: 45
Discharge: HOME | End: 2025-07-22
Payer: COMMERCIAL

## 2025-07-22 ENCOUNTER — TELEPHONE (OUTPATIENT)
Dept: SURGERY | Facility: CLINIC | Age: 45
End: 2025-07-22

## 2025-07-22 VITALS — WEIGHT: 129 LBS | HEIGHT: 60 IN | BODY MASS INDEX: 25.32 KG/M2

## 2025-07-22 DIAGNOSIS — R92.1 BREAST CALCIFICATIONS: Primary | ICD-10-CM

## 2025-07-22 DIAGNOSIS — R92.1 BREAST CALCIFICATIONS: ICD-10-CM

## 2025-07-22 PROCEDURE — 77066 DX MAMMO INCL CAD BI: CPT

## 2025-07-22 PROCEDURE — 77062 BREAST TOMOSYNTHESIS BI: CPT

## 2025-07-22 NOTE — TELEPHONE ENCOUNTER
----- Message from Mary Mojica sent at 7/22/2025  1:27 PM EDT -----  Can you tell the patient her mammogram looked ok, but because of her dense breasts, she should repeat it in 6 months. I have placed the order.

## 2025-08-28 DIAGNOSIS — M18.0 OSTEOARTHRITIS OF CARPOMETACARPAL (CMC) JOINT OF BOTH THUMBS: ICD-10-CM

## 2025-09-02 ENCOUNTER — HOSPITAL ENCOUNTER (OUTPATIENT)
Dept: RADIOLOGY | Facility: CLINIC | Age: 45
Discharge: HOME | End: 2025-09-02
Payer: COMMERCIAL

## 2025-09-02 DIAGNOSIS — M18.0 OSTEOARTHRITIS OF CARPOMETACARPAL (CMC) JOINT OF BOTH THUMBS: ICD-10-CM

## 2025-09-02 PROCEDURE — 73140 X-RAY EXAM OF FINGER(S): CPT | Mod: LT

## 2025-09-03 ENCOUNTER — APPOINTMENT (OUTPATIENT)
Dept: ORTHOPEDIC SURGERY | Facility: CLINIC | Age: 45
End: 2025-09-03
Payer: COMMERCIAL

## 2025-09-03 ENCOUNTER — APPOINTMENT (OUTPATIENT)
Dept: RADIOLOGY | Facility: CLINIC | Age: 45
End: 2025-09-03
Payer: COMMERCIAL

## 2025-09-03 PROBLEM — M19.032 CMC DJD(CARPOMETACARPAL DEGENERATIVE JOINT DISEASE), LOCALIZED PRIMARY, LEFT: Status: ACTIVE | Noted: 2025-09-03

## 2025-09-03 ASSESSMENT — ENCOUNTER SYMPTOMS
OCCASIONAL FEELINGS OF UNSTEADINESS: 0
DEPRESSION: 1
LOSS OF SENSATION IN FEET: 0

## 2025-09-03 ASSESSMENT — PAIN - FUNCTIONAL ASSESSMENT: PAIN_FUNCTIONAL_ASSESSMENT: 0-10

## 2025-10-15 ENCOUNTER — APPOINTMENT (OUTPATIENT)
Dept: ORTHOPEDIC SURGERY | Facility: CLINIC | Age: 45
End: 2025-10-15
Payer: COMMERCIAL

## 2025-11-03 ENCOUNTER — APPOINTMENT (OUTPATIENT)
Dept: PRIMARY CARE | Facility: CLINIC | Age: 45
End: 2025-11-03
Payer: COMMERCIAL

## (undated) DEVICE — NEEDLE, HYPODERMIC, REGULAR WALL, REGULAR BEVEL, 25 G X 1.5 IN

## (undated) DEVICE — Device

## (undated) DEVICE — KIT, UPPER EXTREMITY, CUSTOM, PORTAGE

## (undated) DEVICE — SPLINT SYSTEM, ORTHO GLASS, 4 IN X 15 FT, SYNTHETIC

## (undated) DEVICE — DRESSING, GAUZE, PETROLATUM, STRIP, XEROFORM, 1 X 8 IN, STERILE

## (undated) DEVICE — CORD, CAUTERY, BIOPOLAR FORCEP, 12FT

## (undated) DEVICE — RETRIEVER, SUTURE, HEWSON

## (undated) DEVICE — BANDAGE, ELASTIC, PREMIUM, SELF-CLOSE, 2 IN X 5 YD, STERILE

## (undated) DEVICE — GLOVE, PROTEXIS PI CLASSIC, SZ-8.0, PF, PF, LF

## (undated) DEVICE — DRAPE, C-ARM, FLUROSCAN, MINI

## (undated) DEVICE — NEEDLE, HYPODERMIC, REGULAR WALL, REGULAR BEVEL, 18 G X 1.5 IN

## (undated) DEVICE — SYRINGE, 10 CC, LUER LOCK

## (undated) DEVICE — SUTURE, VICRYL, 4-0, 18 IN, UNDYED BR PS-2

## (undated) DEVICE — COVER HANDLE LIGHT, STERIS, BLUE, STERILE

## (undated) DEVICE — CUFF, TOURNIQUET, 18 X 4, DUAL PORT/SNGL BLADDER, DISP, LF

## (undated) DEVICE — TOWEL PACK, STERILE, 4/PACK, BLUE

## (undated) DEVICE — PADDING, CAST, SOFT, 2 X 4YDS

## (undated) DEVICE — APPLICATOR, CHLORAPREP, W/ORANGE TINT, 26ML

## (undated) DEVICE — BUR, ROUND, 4MM, 12 FLUTES CUTTING

## (undated) DEVICE — SOLUTION, IRRIGATION, SODIUM CHLORIDE 0.9%, 1000 ML, POUR BOTTLE

## (undated) DEVICE — SUTURE, ETHILON, 4-0, BLK, MONO, PS-2 18

## (undated) DEVICE — SUTURE, SUPRAMID EXTRA, 3-0, 18IN 1/2 CIR